# Patient Record
Sex: FEMALE | Race: WHITE | NOT HISPANIC OR LATINO | Employment: PART TIME | URBAN - METROPOLITAN AREA
[De-identification: names, ages, dates, MRNs, and addresses within clinical notes are randomized per-mention and may not be internally consistent; named-entity substitution may affect disease eponyms.]

---

## 2017-02-06 ENCOUNTER — OFFICE VISIT (OUTPATIENT)
Dept: OBGYN | Facility: CLINIC | Age: 47
End: 2017-02-06
Payer: COMMERCIAL

## 2017-02-06 VITALS
BODY MASS INDEX: 18.26 KG/M2 | HEART RATE: 56 BPM | SYSTOLIC BLOOD PRESSURE: 110 MMHG | HEIGHT: 60 IN | WEIGHT: 93 LBS | DIASTOLIC BLOOD PRESSURE: 68 MMHG

## 2017-02-06 DIAGNOSIS — N95.1 SYMPTOMS, SUCH AS FLUSHING, SLEEPLESSNESS, HEADACHE, LACK OF CONCENTRATION, ASSOCIATED WITH THE MENOPAUSE: ICD-10-CM

## 2017-02-06 DIAGNOSIS — Z01.419 ENCOUNTER FOR GYNECOLOGICAL EXAMINATION WITHOUT ABNORMAL FINDING: Primary | ICD-10-CM

## 2017-02-06 DIAGNOSIS — F32.A DEPRESSION, UNSPECIFIED DEPRESSION TYPE: ICD-10-CM

## 2017-02-06 PROCEDURE — 87624 HPV HI-RISK TYP POOLED RSLT: CPT | Performed by: OBSTETRICS & GYNECOLOGY

## 2017-02-06 PROCEDURE — 83001 ASSAY OF GONADOTROPIN (FSH): CPT | Performed by: OBSTETRICS & GYNECOLOGY

## 2017-02-06 PROCEDURE — 36415 COLL VENOUS BLD VENIPUNCTURE: CPT | Performed by: OBSTETRICS & GYNECOLOGY

## 2017-02-06 PROCEDURE — 99212 OFFICE O/P EST SF 10 MIN: CPT | Mod: 25 | Performed by: OBSTETRICS & GYNECOLOGY

## 2017-02-06 PROCEDURE — 99386 PREV VISIT NEW AGE 40-64: CPT | Performed by: OBSTETRICS & GYNECOLOGY

## 2017-02-06 PROCEDURE — G0145 SCR C/V CYTO,THINLAYER,RESCR: HCPCS | Performed by: OBSTETRICS & GYNECOLOGY

## 2017-02-06 RX ORDER — ESCITALOPRAM OXALATE 10 MG/1
10 TABLET ORAL DAILY
Qty: 90 TABLET | Refills: 3 | Status: SHIPPED | OUTPATIENT
Start: 2017-02-06 | End: 2018-06-12

## 2017-02-06 ASSESSMENT — ANXIETY QUESTIONNAIRES
7. FEELING AFRAID AS IF SOMETHING AWFUL MIGHT HAPPEN: NEARLY EVERY DAY
3. WORRYING TOO MUCH ABOUT DIFFERENT THINGS: NEARLY EVERY DAY
GAD7 TOTAL SCORE: 21
IF YOU CHECKED OFF ANY PROBLEMS ON THIS QUESTIONNAIRE, HOW DIFFICULT HAVE THESE PROBLEMS MADE IT FOR YOU TO DO YOUR WORK, TAKE CARE OF THINGS AT HOME, OR GET ALONG WITH OTHER PEOPLE: EXTREMELY DIFFICULT
6. BECOMING EASILY ANNOYED OR IRRITABLE: NEARLY EVERY DAY
5. BEING SO RESTLESS THAT IT IS HARD TO SIT STILL: NEARLY EVERY DAY
2. NOT BEING ABLE TO STOP OR CONTROL WORRYING: NEARLY EVERY DAY
1. FEELING NERVOUS, ANXIOUS, OR ON EDGE: NEARLY EVERY DAY

## 2017-02-06 ASSESSMENT — PATIENT HEALTH QUESTIONNAIRE - PHQ9: 5. POOR APPETITE OR OVEREATING: NEARLY EVERY DAY
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
## 2017-02-07 LAB — FSH SERPL-ACNC: 153.6 IU/L

## 2017-02-07 ASSESSMENT — ANXIETY QUESTIONNAIRES: GAD7 TOTAL SCORE: 21

## 2017-02-07 ASSESSMENT — PATIENT HEALTH QUESTIONNAIRE - PHQ9: SUM OF ALL RESPONSES TO PHQ QUESTIONS 1-9: 24

## 2017-02-09 LAB
COPATH REPORT: NORMAL
PAP: NORMAL

## 2017-02-10 ENCOUNTER — TRANSFERRED RECORDS (OUTPATIENT)
Dept: HEALTH INFORMATION MANAGEMENT | Facility: CLINIC | Age: 47
End: 2017-02-10

## 2017-02-13 LAB
FINAL DIAGNOSIS: NORMAL
HPV HR 12 DNA CVX QL NAA+PROBE: NEGATIVE
HPV16 DNA SPEC QL NAA+PROBE: NEGATIVE
HPV18 DNA SPEC QL NAA+PROBE: NEGATIVE
SPECIMEN DESCRIPTION: NORMAL

## 2018-06-12 ENCOUNTER — OFFICE VISIT (OUTPATIENT)
Dept: OBGYN | Facility: CLINIC | Age: 48
End: 2018-06-12
Payer: COMMERCIAL

## 2018-06-12 VITALS
DIASTOLIC BLOOD PRESSURE: 60 MMHG | WEIGHT: 91.4 LBS | BODY MASS INDEX: 17.94 KG/M2 | SYSTOLIC BLOOD PRESSURE: 98 MMHG | HEIGHT: 60 IN

## 2018-06-12 DIAGNOSIS — N32.81 OAB (OVERACTIVE BLADDER): Primary | ICD-10-CM

## 2018-06-12 PROCEDURE — 99214 OFFICE O/P EST MOD 30 MIN: CPT | Performed by: OBSTETRICS & GYNECOLOGY

## 2018-06-12 NOTE — PROGRESS NOTES
SUBJECTIVE:                                                   Maribel Mcgrath is a 47 year old female who presents to clinic today for the following health issue(s):  Patient presents with:  Vaginal Problem: Patient c/o pelvic pain/pressure as well as urgency for about a week now. Was seen with pcp for a negative UTI.         HPI:  Patient has been drinking a lot of lemonade recently, then started to get suprapubic pain and urgency and frequency  Just started to get nocturia also  Had a normal ua at her pcp  No dysuria, cvat    No LMP recorded. Patient is postmenopausal..   Patient is not sexually active, No obstetric history on file..  Using menopause for contraception.    reports that she has never smoked. She has never used smokeless tobacco.    STD testing offered?  Declined    Health maintenance updated:  yes    Today's PHQ-2 Score: No flowsheet data found.  Today's PHQ-9 Score:   PHQ-9 SCORE 2/6/2017   Total Score 24     Today's IVAN-7 Score:   IVAN-7 SCORE 2/6/2017   Total Score 21       Problem list and histories reviewed & adjusted, as indicated.  Additional history: as documented.    There is no problem list on file for this patient.    Past Surgical History:   Procedure Laterality Date     ADENOIDECTOMY       COSMETIC MAMMOPLASTY AUGMENTATION BILATERAL  2011      Social History   Substance Use Topics     Smoking status: Never Smoker     Smokeless tobacco: Never Used     Alcohol use 0.0 oz/week     0 Standard drinks or equivalent per week      Problem (# of Occurrences) Relation (Name,Age of Onset)    DIABETES (1) Maternal Grandmother    HEART DISEASE (2) Mother, Father    Hyperlipidemia (2) Mother, Father            No current outpatient prescriptions on file.     No current facility-administered medications for this visit.      No Known Allergies    ROS:  12 point review of systems negative other than symptoms noted below.  Genitourinary: Pelvic Pain and Urgency  Psychiatric: Anxiety    OBJECTIVE:      BP 98/60  Ht 5' (1.524 m)  Wt 91 lb 6.4 oz (41.5 kg)  BMI 17.85 kg/m2  Body mass index is 17.85 kg/(m^2).    Exam:  Constitutional:  Appearance: Well nourished, well developed alert, in no acute distress  Chest:  Respiratory Effort:  Breathing unlabored  Skin:General Inspection:  No rashes present, no lesions present, no areas of discoloration; Genitalia and Groin:  No rashes present, no lesions present, no areas of discoloration, no masses present.  Neurologic/Psychiatric:  Mental Status:  Oriented X3   Pelvic Exam:  External Genitalia:     Normal appearance for age, no discharge present, no tenderness present, no inflammatory lesions present, color normal  Vagina:     Normal vaginal vault without central or paravaginal defects, no discharge present, no inflammatory lesions present, no masses present  Bladder:     Nontender to palpation  Urethra:   Urethral Body:  Urethra palpation normal, urethra structural support normal   Urethral Meatus:  No erythema or lesions present  Cervix:     Appearance healthy, no lesions present, nontender to palpation, no bleeding present  Uterus:     Uterus: firm, normal sized and nontender, midplane in position.   Adnexa:     No adnexal tenderness present, no adnexal masses present  Perineum:     Perineum within normal limits, no evidence of trauma, no rashes or skin lesions present  Anus:     Anus within normal limits, no hemorrhoids present  Inguinal Lymph Nodes:     No lymphadenopathy present  Pubic Hair:     Normal pubic hair distribution for age  Genitalia and Groin:     No rashes present, no lesions present, no areas of discoloration, no masses present       In-Clinic Test Results:  No results found for this or any previous visit (from the past 24 hour(s)).    ASSESSMENT/PLAN:                                                        ICD-10-CM    1. OAB (overactive bladder) N32.81          Patient is likely having oab symptoms  She is menopausal and not on any estrogen  rec  she stop the lemonade and reduce other acidic foods in her diet  Discussed oab symptoms, common in menopause  Normal pelvic exam  I don't think meds are indicated at this time since her symptoms seem to be associated with a significant increase in acidic beverage intake and hopefully will improve by changing beverage to water    Karley Ross MD  [facility]

## 2018-06-12 NOTE — MR AVS SNAPSHOT
"              After Visit Summary   6/12/2018    Maribel Mcgrath    MRN: [id number]           Patient Information     Date Of Birth          1970        Visit Information        Provider Department      6/12/2018 8:00 AM Karley Ross MD [facility]        Today's Diagnoses     OAB (overactive bladder)    -  1       Follow-ups after your visit        Your next 10 appointments already scheduled     Jun 19, 2018  2:45 PM CDT   MA SCREENING DIGITAL BILATERAL with [email or web address]   [facility] ([facility])    [address] [place] [address], [address]  [facility] [address] [phone]           Do not use any powder, lotion or deodorant under your arms or on your breast. If you do, we will ask you to remove it before your exam.  Wear comfortable, two-piece clothing.  If you have any allergies, tell your care team.  Bring any previous mammograms from other facilities or have them mailed to the breast center. Three-dimensional (3D) mammograms are available at [place] locations in [facility], [facility], and [place]. [facility] locations include [place] [facility] in [place]. Benefits of 3D mammograms include: - Improved rate of cancer detection - Decreases your chance of having to go back for more tests, which means fewer: - \"False-positive\" results (This means that there is an abnormal area but it isn't cancer.) - Invasive testing procedures, such as a biopsy or surgery - Can provide clearer images of the breast if you have dense breast tissue. 3D mammography is an optional exam that anyone can have with a 2D mammogram. It doesn't replace or take the place of a 2D mammogram. 2D mammograms remain an effective screening test for all women.  Not all insurance companies cover the cost of a 3D mammogram. Check with your insurance.            Jun 19, 2018  3:10 PM CDT   PHYSICAL with Karley RAPHAEL " "MD [place]   [facility] [place] ([facility])    [address] [address] [phone]              Who to contact     If you have questions or need follow up information about today's clinic visit or your schedule please contact [facility] [place] directly at [phone].  Normal or non-critical lab and imaging results will be communicated to you by MyChart, letter or phone within 4 business days after the clinic has received the results. If you do not hear from us within 7 days, please contact the clinic through [employer] or phone. If you have a critical or abnormal lab result, we will notify you by phone as soon as possible.  Submit refill requests through [employer] or call your pharmacy and they will forward the refill request to us. Please allow 3 business days for your refill to be completed.          Additional Information About Your Visit        [employer] [facility] Information     [employer] lets you send messages to your doctor, view your test results, renew your prescriptions, schedule appointments and more. To sign up, go to [email or web address] . Click on \"Log in\" on the left side of the screen, which will take you to the Welcome page. Then click on \"Sign up Now\" on the right side of the page.     You will be asked to enter the access code listed below, as well as some personal information. Please follow the directions to create your username and password.     Your access code is: [email or web address]  Expires: 9/10/2018  7:59 AM     Your access code will  in 90 days. If you need help or a new code, please call your [facility] clinic or [phone].        Care EveryWhere ID     This is your Care EveryWhere ID. This could be used by other organizations to access your [facility] medical records  [id number]        Your Vitals Were     Height BMI (Body Mass Index)                5' (1.524 m) 17.85 kg/m2           Blood Pressure from Last 3 " Encounters:   06/12/18 98/60   02/06/17 110/68    Weight from Last 3 Encounters:   06/12/18 91 lb 6.4 oz (41.5 kg)   02/06/17 93 lb (42.2 kg)              Today, you had the following     No orders found for display       Primary Care Provider Office Phone # Fax [facility] [phone] [address] [facility] [address]        Equal Access to Services     RUTHANN SANCHEZ : Hadii aad ku hadasho Soomaali, waaxda luqadaha, qaybta kaalmada adeegyada, waxay idiin hayaan adestephanie alvaradoarayina lajuan jose . So [facility] [phone].    ATENCIÓN: Si habla español, tiene a betancourt disposición servicios gratuitos de asistencia lingüística. [facility] [phone].    We comply with applicable federal civil rights laws and [place] laws. We do not discriminate on the basis of race, color, national origin, age, disability, sex, sexual orientation, or gender identity.            Thank you!     Thank you for choosing [facility] [place], [place]  for your care. Our goal is always to provide you with excellent care. Hearing back from our patients is one way we can continue to improve our services. Please take a few minutes to complete the written survey that you may receive in the mail after your visit with us. Thank you!             Your Updated Medication List - Protect others around you: Learn how to safely use, store and throw away your medicines at www.disposemymeds.org.      Notice  As of 6/12/2018  8:32 AM    You have not been prescribed any medications.

## 2018-06-19 ENCOUNTER — OFFICE VISIT (OUTPATIENT)
Dept: OBGYN | Facility: CLINIC | Age: 48
End: 2018-06-19
Payer: COMMERCIAL

## 2018-06-19 ENCOUNTER — RADIANT APPOINTMENT (OUTPATIENT)
Dept: MAMMOGRAPHY | Facility: CLINIC | Age: 48
End: 2018-06-19
Payer: COMMERCIAL

## 2018-06-19 VITALS
HEIGHT: 60 IN | WEIGHT: 83 LBS | SYSTOLIC BLOOD PRESSURE: 118 MMHG | HEART RATE: 66 BPM | BODY MASS INDEX: 16.3 KG/M2 | DIASTOLIC BLOOD PRESSURE: 74 MMHG

## 2018-06-19 DIAGNOSIS — Z12.31 VISIT FOR SCREENING MAMMOGRAM: ICD-10-CM

## 2018-06-19 DIAGNOSIS — E55.9 VITAMIN D DEFICIENCY: ICD-10-CM

## 2018-06-19 DIAGNOSIS — Z01.419 ENCOUNTER FOR GYNECOLOGICAL EXAMINATION WITHOUT ABNORMAL FINDING: Primary | ICD-10-CM

## 2018-06-19 DIAGNOSIS — E28.319 PREMATURE MENOPAUSE: ICD-10-CM

## 2018-06-19 DIAGNOSIS — N32.81 OAB (OVERACTIVE BLADDER): ICD-10-CM

## 2018-06-19 PROCEDURE — 77067 SCR MAMMO BI INCL CAD: CPT | Mod: TC

## 2018-06-19 PROCEDURE — 99396 PREV VISIT EST AGE 40-64: CPT | Performed by: OBSTETRICS & GYNECOLOGY

## 2018-06-19 ASSESSMENT — ANXIETY QUESTIONNAIRES
3. WORRYING TOO MUCH ABOUT DIFFERENT THINGS: NEARLY EVERY DAY
GAD7 TOTAL SCORE: 21
1. FEELING NERVOUS, ANXIOUS, OR ON EDGE: NEARLY EVERY DAY
7. FEELING AFRAID AS IF SOMETHING AWFUL MIGHT HAPPEN: NEARLY EVERY DAY
5. BEING SO RESTLESS THAT IT IS HARD TO SIT STILL: NEARLY EVERY DAY
6. BECOMING EASILY ANNOYED OR IRRITABLE: NEARLY EVERY DAY
IF YOU CHECKED OFF ANY PROBLEMS ON THIS QUESTIONNAIRE, HOW DIFFICULT HAVE THESE PROBLEMS MADE IT FOR YOU TO DO YOUR WORK, TAKE CARE OF THINGS AT HOME, OR GET ALONG WITH OTHER PEOPLE: EXTREMELY DIFFICULT
2. NOT BEING ABLE TO STOP OR CONTROL WORRYING: NEARLY EVERY DAY

## 2018-06-19 ASSESSMENT — PATIENT HEALTH QUESTIONNAIRE - PHQ9: 5. POOR APPETITE OR OVEREATING: NEARLY EVERY DAY

## 2018-06-19 NOTE — PROGRESS NOTES
Maribel is a 47 year old No obstetric history on file. female who presents for annual exam.     Besides routine health maintenance, she has no other health concerns today .    HPI:  The patient's PCP is [facility]. [facility].    Patient had a very early menopause and has a very high fsh 157 last years  Not on hormone replacement therapy  Had an ankle fracture this winter when running and tripped  Needs a bone density and vit D level    Severe vaginal dryness and pain with sex  Also oab symptoms, got better recently when stopped drinking lemonade      GYNECOLOGIC HISTORY:    No LMP recorded. Patient is postmenopausal.  Her current contraception method is: menopause.  She  reports that she has never smoked. She has never used smokeless tobacco.    Patient is sexually active.  STD testing offered?  Declined  Last PHQ-9 score on record =   PHQ-9 SCORE 6/19/2018   Total Score 24     Last GAD7 score on record =   IVAN-7 SCORE 6/19/2018   Total Score 21     Alcohol Score = 2    HEALTH MAINTENANCE:  Cholesterol: 5/3/16  Total= 194, Triglycerides=53, HDL=64, KXR=972, PDK=701, TSH=6/12/17 2.46  Last Mammo: one year ago, Result: normal, Next Mammo: today   Pap: (  Lab Results   Component Value Date    PAP NIL 02/06/2017 2/6/17 WNL HPV (-)neg  Colonoscopy:  NA, Result: not applicable, Next Colonoscopy: due in September.  Dexa:  NA    Health maintenance updated:  yes    HISTORY:  Obstetric History     No data available          There is no problem list on file for this patient.    Past Surgical History:   Procedure Laterality Date     ADENOIDECTOMY       COSMETIC MAMMOPLASTY AUGMENTATION BILATERAL  2011      Social History   Substance Use Topics     Smoking status: Never Smoker     Smokeless tobacco: Never Used     Alcohol use 0.0 oz/week     0 Standard drinks or equivalent per week      Problem (# of Occurrences) Relation (Name,Age of Onset)    Diabetes (1) Maternal Grandmother    HEART DISEASE (2)  "Mother, Father    Hyperlipidemia (2) Mother, Father            No current outpatient prescriptions on file.     No current facility-administered medications for this visit.      No Known Allergies    Past medical, surgical, social and family histories were reviewed and updated in EPIC.    ROS:   Cardiovascular: Chest Pain, Lightheadedness and Palpitations  Genitourinary: Pelvic Pain and Urgency  Psychiatric: Anxiety and Depression    EXAM:  /74  Pulse 66  Ht 4' 11.5\" (1.511 m)  Wt 83 lb (37.6 kg)  Breastfeeding? No  BMI 16.48 kg/m2   BMI: Body mass index is 16.48 kg/(m^2).    PHYSICAL EXAM:  Constitutional:  Appearance: Well nourished, well developed, alert, in no acute distress  Neck:  Lymph Nodes:  No lymphadenopathy present    Thyroid:  Gland size normal, nontender, no nodules or masses present  on palpation  Chest:  Respiratory Effort:  Breathing unlabored  Cardiovascular:    Heart: Auscultation:  Regular rate, normal rhythm, no murmurs present  Breasts: Inspection of Breasts:  No lymphadenopathy present., Palpation of Breasts and Axillae:  No masses present on palpation, no breast tenderness., Axillary Lymph Nodes:  No lymphadenopathy present. and No nodularity, asymmetry or nipple discharge bilaterally.  Gastrointestinal:   Abdominal Examination:  Abdomen nontender to palpation, tone normal without rigidity or guarding, no masses present, umbilicus without lesions   Liver and Spleen:  No hepatomegaly present, liver nontender to palpation    Hernias:  No hernias present  Lymphatic: Lymph Nodes:  No other lymphadenopathy present  Skin:  General Inspection:  No rashes present, no lesions present, no areas of  discoloration    Genitalia and Groin:  No rashes present, no lesions present, no areas of  discoloration, no masses present  Neurologic/Psychiatric:    Mental Status:  Oriented X3     Pelvic Exam:  External Genitalia:     Normal appearance for age, no discharge present, no tenderness present, no " inflammatory lesions present, color normal  Vagina:     Normal vaginal vault without central or paravaginal defects, ATROPHIC  Bladder:     Nontender to palpation  Urethra:   Urethral Body:  Urethra palpation normal, urethra structural support normal   Urethral Meatus:  No erythema or lesions present  Cervix:     Appearance healthy, no lesions present, nontender to palpation, no bleeding present  Uterus:     Nontender to palpation, no masses present, position anteflexed, mobility: normal  Adnexa:     No adnexal tenderness present, no adnexal masses present  Perineum:     Perineum within normal limits, no evidence of trauma, no rashes or skin lesions present  Inguinal Lymph Nodes:     No lymphadenopathy present      COUNSELING:   Reviewed preventive health counseling, as reflected in patient instructions       Regular exercise       Healthy diet/nutrition    BMI: Body mass index is 16.48 kg/(m^2).      ASSESSMENT:  47 year old female with satisfactory annual exam.    ICD-10-CM    1. Encounter for gynecological examination without abnormal finding Z01.419        PLAN:  Make psych appt to manage her anxiety    Return for dexa and Vit D, I suspect she will have poor density  Start vit D 2000 IU  Avoid acidic beverages    Prescription sent for estring   If she thinks too expensive, can call for change to estrogen vaginal cream twice a week which would be cheaper but much messier    mammo done  Pap and hpv q 5y  Discussed oab briefly, risk of osteoporosis, vaginal atrophy    Karley Ross MD

## 2018-06-19 NOTE — MR AVS SNAPSHOT
After Visit Summary   6/19/2018    Maribel Mcgrath    MRN: [id number]           Patient Information     Date Of Birth          1970        Visit Information        Provider Department      6/19/2018 3:10 PM Karley Ross MD [facility]        Today's Diagnoses     Encounter for gynecological examination without abnormal finding    -  1    Vitamin D deficiency        Premature menopause        OAB (overactive bladder)           Follow-ups after your visit        Your next 10 appointments already scheduled     Jun 25, 2018  9:00 AM CDT   DX HIP/PELVIS/SPINE with WEDEXA1   [facility] ([facility])    [address] [address]   [phone]           Please do not take any of the following 24 hours prior to the day of your exam: vitamins, calcium tablets, antacids.  If possible, please wear clothes without metal (snaps, zippers). A sweatsuit works well.              Future tests that were ordered for you today     Open Future Orders        Priority Expected Expires Ordered    DX Hip/Pelvis/Spine Routine  6/19/2019 6/19/2018    Vitamin D Deficiency Routine  6/19/2019 6/19/2018            Who to contact     If you have questions or need follow up information about today's clinic visit or your schedule please contact [facility] directly at [phone].  Normal or non-critical lab and imaging results will be communicated to you by MyChart, letter or phone within 4 business days after the clinic has received the results. If you do not hear from us within 7 days, please contact the clinic through MyChart or phone. If you have a critical or abnormal lab result, we will notify you by phone as soon as possible.  Submit refill requests through [employer] or call your pharmacy and they will forward the refill request to us. Please allow 3 business days for your refill to be completed.           "Additional Information About Your Visit        MyChart Information     Sunlot lets you send messages to your doctor, view your test results, renew your prescriptions, schedule appointments and more. To sign up, go to [email or web address] . Click on \"Log in\" on the left side of the screen, which will take you to the Welcome page. Then click on \"Sign up Now\" on the right side of the page.     You will be asked to enter the access code listed below, as well as some personal information. Please follow the directions to create your username and password.     Your access code is: [email or web address]  Expires: 9/10/2018  7:59 AM     Your access code will  in 90 days. If you need help or a new code, please call your [facility] clinic or [phone].        Care EveryWhere ID     This is your Care EveryWhere ID. This could be used by other organizations to access your [facility] medical records  [id number]        Your Vitals Were     Pulse Height Breastfeeding? BMI (Body Mass Index)          66 4' 11.5\" (1.511 m) No 16.48 kg/m2         Blood Pressure from Last 3 Encounters:   18 118/74   18 98/60   17 110/68    Weight from Last 3 Encounters:   18 83 lb (37.6 kg)   18 91 lb 6.4 oz (41.5 kg)   17 93 lb (42.2 kg)                 Today's Medication Changes          These changes are accurate as of 18  3:54 PM.  If you have any questions, ask your nurse or doctor.               Start taking these medicines.        Dose/Directions    estradiol 2 MG vaginal ring   Commonly known as:  ESTRING   Used for:  Premature menopause   Started by:  Karley Ross MD        Dose:  1 each   Place 1 each vaginally every 3 months   Quantity:  1 each   Refills:  3            Where to get your medicines      These medications were sent to [facility] [address] - [address] & [place] [address] [place] [address]     Phone:  [phone]     " estradiol 2 MG vaginal ring                Primary Care Provider Office Phone # Fax #    [facility] [phone] [address] [facility] [address]        Equal Access to Services     RUTHANN SANCHEZ : Hadii aad ku hadwandy Pedraza, wajuan albertoda luqadaha, qalashaunta kaalmada jamal, katie kleinrona blankenship miryam shane lucero. So [facility] [phone].    ATENCIÓN: Si habla español, tiene a betancourt disposición servicios gratuitos de asistencia lingüística. Llame al [phone].    We comply with applicable federal civil rights laws and [place] laws. We do not discriminate on the basis of race, color, national origin, age, disability, sex, sexual orientation, or gender identity.            Thank you!     Thank you for choosing [facility]  for your care. Our goal is always to provide you with excellent care. Hearing back from our patients is one way we can continue to improve our services. Please take a few minutes to complete the written survey that you may receive in the mail after your visit with us. Thank you!             Your Updated Medication List - Protect others around you: Learn how to safely use, store and throw away your medicines at www.disposemymeds.org.          This list is accurate as of 6/19/18  3:54 PM.  Always use your most recent med list.                   Brand Name Dispense Instructions for use Diagnosis    estradiol 2 MG vaginal ring    ESTRING    1 each    Place 1 each vaginally every 3 months    Premature menopause

## 2018-06-20 DIAGNOSIS — E28.319 PREMATURE MENOPAUSE: Primary | ICD-10-CM

## 2018-06-20 DIAGNOSIS — N89.8 VAGINAL DRYNESS: ICD-10-CM

## 2018-06-20 RX ORDER — ESTRADIOL 10 UG/1
10 INSERT VAGINAL
Qty: 24 TABLET | Refills: 3 | Status: SHIPPED | OUTPATIENT
Start: 2018-06-21 | End: 2021-08-31

## 2018-06-20 ASSESSMENT — PATIENT HEALTH QUESTIONNAIRE - PHQ9: SUM OF ALL RESPONSES TO PHQ QUESTIONS 1-9: 24

## 2018-06-20 ASSESSMENT — ANXIETY QUESTIONNAIRES: GAD7 TOTAL SCORE: 21

## 2018-06-20 NOTE — TELEPHONE ENCOUNTER
[facility] pharmacy called and Estradiol vaginal ring is not covered by insurance. Stated the Estradiol vaginal tabs are covered by insurance. Pharmacist wants to know if we will do a PA or send Rx for estradiol vag tabs.  Routing to Dr. Ross. Please advise.    Telephone number for Insurance Help desk for PA is [phone]

## 2018-06-21 ENCOUNTER — TELEPHONE (OUTPATIENT)
Dept: OBGYN | Facility: CLINIC | Age: 48
End: 2018-06-21

## 2018-06-21 NOTE — TELEPHONE ENCOUNTER
Patient will check with insurance and pharmacy for cost and other options. Nothing more to be done until she calls us back.    Info off rejection: plan # [phone]  ID [id number]

## 2018-06-23 ENCOUNTER — HEALTH MAINTENANCE LETTER (OUTPATIENT)
Age: 48
End: 2018-06-23

## 2018-06-25 ENCOUNTER — RADIANT APPOINTMENT (OUTPATIENT)
Dept: BONE DENSITY | Facility: CLINIC | Age: 48
End: 2018-06-25
Payer: COMMERCIAL

## 2018-06-25 DIAGNOSIS — E28.319 PREMATURE MENOPAUSE: ICD-10-CM

## 2018-06-25 PROCEDURE — 77080 DXA BONE DENSITY AXIAL: CPT | Performed by: OBSTETRICS & GYNECOLOGY

## 2019-10-02 ENCOUNTER — HEALTH MAINTENANCE LETTER (OUTPATIENT)
Age: 49
End: 2019-10-02

## 2021-01-15 ENCOUNTER — HEALTH MAINTENANCE LETTER (OUTPATIENT)
Age: 51
End: 2021-01-15

## 2021-08-31 ENCOUNTER — ANCILLARY PROCEDURE (OUTPATIENT)
Dept: MAMMOGRAPHY | Facility: CLINIC | Age: 51
End: 2021-08-31
Payer: COMMERCIAL

## 2021-08-31 ENCOUNTER — OFFICE VISIT (OUTPATIENT)
Dept: OBGYN | Facility: CLINIC | Age: 51
End: 2021-08-31
Payer: COMMERCIAL

## 2021-08-31 VITALS
SYSTOLIC BLOOD PRESSURE: 102 MMHG | RESPIRATION RATE: 18 BRPM | DIASTOLIC BLOOD PRESSURE: 58 MMHG | HEART RATE: 70 BPM | WEIGHT: 90.7 LBS | BODY MASS INDEX: 18.01 KG/M2

## 2021-08-31 DIAGNOSIS — Z12.31 VISIT FOR SCREENING MAMMOGRAM: ICD-10-CM

## 2021-08-31 DIAGNOSIS — E28.319 PREMATURE MENOPAUSE: ICD-10-CM

## 2021-08-31 DIAGNOSIS — Z01.419 ENCOUNTER FOR GYNECOLOGICAL EXAMINATION WITHOUT ABNORMAL FINDING: Primary | ICD-10-CM

## 2021-08-31 PROBLEM — E78.2 MIXED HYPERLIPIDEMIA: Status: ACTIVE | Noted: 2021-08-31

## 2021-08-31 PROBLEM — F98.8 ADD (ATTENTION DEFICIT DISORDER) WITHOUT HYPERACTIVITY: Status: ACTIVE | Noted: 2021-08-31

## 2021-08-31 PROBLEM — H52.03 HYPEROPIA, BILATERAL: Status: ACTIVE | Noted: 2021-08-12

## 2021-08-31 PROBLEM — F32.1 DEPRESSION, MAJOR, SINGLE EPISODE, MODERATE (H): Status: ACTIVE | Noted: 2019-10-16

## 2021-08-31 PROBLEM — H52.4 PRESBYOPIA: Status: ACTIVE | Noted: 2021-08-12

## 2021-08-31 PROCEDURE — 99386 PREV VISIT NEW AGE 40-64: CPT | Performed by: OBSTETRICS & GYNECOLOGY

## 2021-08-31 PROCEDURE — 77063 BREAST TOMOSYNTHESIS BI: CPT | Mod: TC | Performed by: RADIOLOGY

## 2021-08-31 PROCEDURE — 77067 SCR MAMMO BI INCL CAD: CPT | Mod: TC | Performed by: RADIOLOGY

## 2021-08-31 PROCEDURE — 82306 VITAMIN D 25 HYDROXY: CPT | Performed by: OBSTETRICS & GYNECOLOGY

## 2021-08-31 PROCEDURE — 36415 COLL VENOUS BLD VENIPUNCTURE: CPT | Performed by: OBSTETRICS & GYNECOLOGY

## 2021-08-31 PROCEDURE — G0145 SCR C/V CYTO,THINLAYER,RESCR: HCPCS | Performed by: OBSTETRICS & GYNECOLOGY

## 2021-08-31 PROCEDURE — 87624 HPV HI-RISK TYP POOLED RSLT: CPT | Performed by: OBSTETRICS & GYNECOLOGY

## 2021-08-31 RX ORDER — ROSUVASTATIN CALCIUM 5 MG/1
TABLET, COATED ORAL
COMMUNITY
Start: 2019-11-28 | End: 2021-08-31

## 2021-08-31 RX ORDER — BUPROPION HYDROCHLORIDE 150 MG/1
TABLET ORAL
COMMUNITY
Start: 2019-10-17 | End: 2021-08-31

## 2021-08-31 ASSESSMENT — PATIENT HEALTH QUESTIONNAIRE - PHQ9
SUM OF ALL RESPONSES TO PHQ QUESTIONS 1-9: 21
5. POOR APPETITE OR OVEREATING: NEARLY EVERY DAY

## 2021-08-31 ASSESSMENT — ANXIETY QUESTIONNAIRES
6. BECOMING EASILY ANNOYED OR IRRITABLE: NEARLY EVERY DAY
3. WORRYING TOO MUCH ABOUT DIFFERENT THINGS: NEARLY EVERY DAY
GAD7 TOTAL SCORE: 21
7. FEELING AFRAID AS IF SOMETHING AWFUL MIGHT HAPPEN: NEARLY EVERY DAY
IF YOU CHECKED OFF ANY PROBLEMS ON THIS QUESTIONNAIRE, HOW DIFFICULT HAVE THESE PROBLEMS MADE IT FOR YOU TO DO YOUR WORK, TAKE CARE OF THINGS AT HOME, OR GET ALONG WITH OTHER PEOPLE: EXTREMELY DIFFICULT
5. BEING SO RESTLESS THAT IT IS HARD TO SIT STILL: NEARLY EVERY DAY
1. FEELING NERVOUS, ANXIOUS, OR ON EDGE: NEARLY EVERY DAY
2. NOT BEING ABLE TO STOP OR CONTROL WORRYING: NEARLY EVERY DAY

## 2021-08-31 NOTE — PROGRESS NOTES
Maribel is a 50 year old  female who presents for annual exam.     Besides routine health maintenance, she has no other health concerns today .    HPI:  The patient's PCP is  [facility].   Never started nuvaring or vagifem  Early menopause at age 44.   Tried selective serotonin reuptake inhibitor and wellbutrin for depression, doesn't think they helped  Feels like   Her dad  recently, stress with settling estate        GYNECOLOGIC HISTORY:    No LMP recorded. Patient is postmenopausal.    Her current contraception method is: menopause.  She  reports that she has never smoked. She has never used smokeless tobacco.  Patient is sexually active.  STD testing offered?  Declined     Last PHQ-9 score on record =   PHQ-9 SCORE 2021   PHQ-9 Total Score 21     Last GAD7 score on record =   IVAN-7 SCORE 2021   Total Score 21     Alcohol Score =  3    HEALTH MAINTENANCE:    Cholesterol:No results found for: CHOL   Last Mammo: 2018, Result: Normal, Next Mammo: Today     Pap:   Lab Results   Component Value Date    PAP NIL 2017      Colonoscopy: N/A,   Result: Not applicable,  Next Colonoscopy: Due  Dexa:  18    Health maintenance updated:  yes    HISTORY:  OB History    Para Term  AB Living   1 1 1 0 0 1   SAB TAB Ectopic Multiple Live Births   0 0 0 0 1      # Outcome Date GA Lbr [place]/2nd Weight Sex Delivery Anes PTL Lv   1 Term         RENATO       Patient Active Problem List   Diagnosis     ADD (attention deficit disorder) without hyperactivity     Depression, major, single episode, moderate (H)     Hyperopia, bilateral     Mixed hyperlipidemia     Presbyopia     Past Surgical History:   Procedure Laterality Date     ADENOIDECTOMY       COSMETIC MAMMOPLASTY AUGMENTATION BILATERAL        Social History     Tobacco Use     Smoking status: Never Smoker     Smokeless tobacco: Never Used   Substance Use Topics     Alcohol use: Yes     Alcohol/week: 0.0  standard drinks      Problem (# of Occurrences) Relation (Name,Age of Onset)    Diabetes (1) Maternal Grandmother    Heart Disease (2) Mother, Father    Hyperlipidemia (2) Mother, Father            No current outpatient medications on file.     No current facility-administered medications for this visit.     Allergies   Allergen Reactions     Atorvastatin Muscle Pain (Myalgia)     Severe body cramps       Past medical, surgical, social and family histories were reviewed and updated in EPIC.    ROS:   12 point review of systems negative other than symptoms noted below or in the HPI.  No urinary frequency or dysuria, bladder or kidney problems    EXAM:  /58 (BP Location: Right arm, Patient Position: Sitting, Cuff Size: Adult Regular)   Pulse 70   Resp 18   Wt 41.1 kg (90 lb 11.2 oz)   Breastfeeding No   BMI 18.01 kg/m     BMI: Body mass index is 18.01 kg/m .    PHYSICAL EXAM:  Constitutional:   Appearance: Well nourished, well developed, alert, in no acute distress  Neck:  Lymph Nodes:  No lymphadenopathy present    Thyroid:  Gland size normal, nontender, no nodules or masses present  on palpation  Chest:  Respiratory Effort:  Breathing unlabored  Cardiovascular:    Heart: Auscultation:  Regular rate, normal rhythm, no murmurs present  Breasts: Inspection of Breasts:  No lymphadenopathy present., Palpation of Breasts and Axillae:  No masses present on palpation, no breast tenderness., Axillary Lymph Nodes:  No lymphadenopathy present. and No nodularity, asymmetry or nipple discharge bilaterally.  Gastrointestinal:   Abdominal Examination:  Abdomen nontender to palpation, tone normal without rigidity or guarding, no masses present, umbilicus without lesions   Liver and Spleen:  No hepatomegaly present, liver nontender to palpation    Hernias:  No hernias present  Lymphatic: Lymph Nodes:  No other lymphadenopathy present  Skin:  General Inspection:  No rashes present, no lesions present, no areas of   discoloration  Neurologic:    Mental Status:  Oriented X3.  Normal strength and tone, sensory exam                grossly normal, mentation intact and speech normal.    Psychiatric:   Mentation appears normal and affect normal/bright.         Pelvic Exam:  External Genitalia:     Normal appearance for age, no discharge present, no tenderness present, no inflammatory lesions present, color normal  Vagina:     Normal vaginal vault without central or paravaginal defects, no discharge present, no inflammatory lesions present, no masses present  Bladder:     Nontender to palpation  Urethra:   Urethral Body:  Urethra palpation normal, urethra structural support normal   Urethral Meatus:  No erythema or lesions present  Cervix:     Appearance healthy, no lesions present, nontender to palpation, no bleeding present  Uterus:     Uterus: firm, normal sized and nontender, midplane in position.   Adnexa:     No adnexal tenderness present, no adnexal masses present  Perineum:     Perineum within normal limits, no evidence of trauma, no rashes or skin lesions present  Anus:     Anus within normal limits, no hemorrhoids present  Inguinal Lymph Nodes:     No lymphadenopathy present  Pubic Hair:     Normal pubic hair distribution for age  Genitalia and Groin:     No rashes present, no lesions present, no areas of discoloration, no masses present        BMI: Body mass index is 18.01 kg/m .    ASSESSMENT:  50 year old female with satisfactory annual exam.    ICD-10-CM    1. Encounter for gynecological examination without abnormal finding  Z01.419 Pap thin layer screen with HPV - recommended age 30 - 65 years   2. Premature menopause  E28.319        PLAN:      Karley oRss MD

## 2021-09-01 LAB — DEPRECATED CALCIDIOL+CALCIFEROL SERPL-MC: 40 UG/L (ref 20–75)

## 2021-09-01 ASSESSMENT — ANXIETY QUESTIONNAIRES: GAD7 TOTAL SCORE: 21

## 2021-09-02 ENCOUNTER — TELEPHONE (OUTPATIENT)
Dept: OBGYN | Facility: CLINIC | Age: 51
End: 2021-09-02

## 2021-09-02 LAB
BKR LAB AP GYN ADEQUACY: NORMAL
BKR LAB AP GYN INTERPRETATION: NORMAL
BKR LAB AP HPV REFLEX: NORMAL
BKR LAB AP PREVIOUS ABNORMAL: NORMAL
PATH REPORT.COMMENTS IMP SPEC: NORMAL
PATH REPORT.RELEVANT HX SPEC: NORMAL

## 2021-09-02 NOTE — TELEPHONE ENCOUNTER
"Pt calling as she saw her Pap smear results - NIL but read that HPV \"yes\".    Informed pt that HPV results still pending. The result statement \"  HPV Reflex  Yes regardless of result      Means to run HPV regardless of her pap result - which is still pending and no result.    Reviewed HPV hx at length w pt in the meantime to answer questions.  Pt verbalized understanding, in agreement with plan, and voiced no further questions.  Pinky Karimi RN on 9/2/2021 at 11:42 AM    "

## 2021-09-03 LAB
HUMAN PAPILLOMA VIRUS 16 DNA: NEGATIVE
HUMAN PAPILLOMA VIRUS 18 DNA: NEGATIVE
HUMAN PAPILLOMA VIRUS FINAL DIAGNOSIS: NORMAL
HUMAN PAPILLOMA VIRUS OTHER HR: NEGATIVE

## 2021-09-05 ENCOUNTER — HEALTH MAINTENANCE LETTER (OUTPATIENT)
Age: 51
End: 2021-09-05

## 2022-03-28 ENCOUNTER — HOSPITAL ENCOUNTER (EMERGENCY)
Facility: CLINIC | Age: 52
Discharge: HOME | End: 2022-03-28
Attending: NURSE PRACTITIONER | Admitting: NURSE PRACTITIONER
Payer: COMMERCIAL

## 2022-03-28 ENCOUNTER — APPOINTMENT (OUTPATIENT)
Dept: CT IMAGING | Facility: CLINIC | Age: 52
End: 2022-03-28
Attending: NURSE PRACTITIONER
Payer: COMMERCIAL

## 2022-03-28 VITALS
SYSTOLIC BLOOD PRESSURE: 125 MMHG | HEIGHT: 60 IN | TEMPERATURE: 98.2 F | HEART RATE: 74 BPM | WEIGHT: 90 LBS | BODY MASS INDEX: 17.67 KG/M2 | DIASTOLIC BLOOD PRESSURE: 68 MMHG | OXYGEN SATURATION: 100 % | RESPIRATION RATE: 18 BRPM

## 2022-03-28 DIAGNOSIS — S09.90XA CLOSED HEAD INJURY, INITIAL ENCOUNTER: ICD-10-CM

## 2022-03-28 DIAGNOSIS — S06.0X0A CONCUSSION WITHOUT LOSS OF CONSCIOUSNESS, INITIAL ENCOUNTER: ICD-10-CM

## 2022-03-28 PROCEDURE — 72125 CT NECK SPINE W/O DYE: CPT

## 2022-03-28 PROCEDURE — 70450 CT HEAD/BRAIN W/O DYE: CPT

## 2022-03-28 PROCEDURE — 99284 EMERGENCY DEPT VISIT MOD MDM: CPT | Mod: 25

## 2022-03-28 PROCEDURE — 250N000013 HC RX MED GY IP 250 OP 250 PS 637: Performed by: NURSE PRACTITIONER

## 2022-03-28 RX ORDER — ACETAMINOPHEN 500 MG
1000 TABLET ORAL ONCE
Status: COMPLETED | OUTPATIENT
Start: 2022-03-28 | End: 2022-03-28

## 2022-03-28 RX ORDER — ONDANSETRON 4 MG/1
4 TABLET, ORALLY DISINTEGRATING ORAL EVERY 8 HOURS PRN
Qty: 10 TABLET | Refills: 0 | Status: SHIPPED | OUTPATIENT
Start: 2022-03-28 | End: 2022-03-31

## 2022-03-28 RX ADMIN — ACETAMINOPHEN 1000 MG: 500 TABLET, FILM COATED ORAL at 16:05

## 2022-03-28 ASSESSMENT — ENCOUNTER SYMPTOMS
DIZZINESS: 1
APPETITE CHANGE: 1
NAUSEA: 1
NUMBNESS: 0
HEADACHES: 1
WEAKNESS: 0

## 2022-03-28 NOTE — ED TRIAGE NOTES
Pt had a fall in her bathroom last Saturday, first hitting her left jaw and then the back of her head. Pt did not lose consciousness. Pt was not evaluated medically. Today pt reports she has been feeling dizzy and is sensitive to sound. Pt states she is going to [place] on Thursday and wanted to be checked out before she leaves for vacation.

## 2022-03-28 NOTE — ED PROVIDER NOTES
"  History   Chief Complaint:  Head Injury       HPI   Maribel Mcgrath is a right-handed 51 year old female who presents with a head injury after a fall. Per the patient, nine days ago she spun around while brushing her teeth in her bathroom and the rug slipped out from under her, causing her to fall and hit her head. She hit her jaw and the back of her head. She denies loss of consciousness. Since then, she reports ongoing phonophobia, headache, dizziness, nausea, and appetite loss. She also notes feeling \"clumsy\", without focal numbness or weakness. She denies trismus, drainage from her ear or mouth. She denies alcohol use since the fall. She also denies numbness, weakness, visual disturbance. She has not taken any medications for her symptoms. She is traveling to [place] in three days and wanted to be evaluated prior to this.    Review of Systems   Constitutional: Positive for appetite change.   Eyes: Negative for visual disturbance.        Positive for phonophobia   Gastrointestinal: Positive for nausea.   Neurological: Positive for dizziness and headaches. Negative for weakness and numbness.        Positive for dropping things unintentionally    All other systems reviewed and are negative.    Allergies:  Atorvastatin    Medications:  The patient is not currently taking any prescribed medications.    Past Medical History:     Anxiety  Hyperlipidemia  Major depression  ADD    Past Surgical History:    Adenoidectomy  Bilateral mammoplasty, augmentation     Family History:    Mother: Hyperlipidemia, heart disease   Father: Hyperlipidemia, heart disease     Social History:  The patient was unaccompanied to the ER  Alcohol Use: Negative    Physical Exam     Patient Vitals for the past 24 hrs:   BP Temp Temp src Pulse Resp SpO2 Height Weight   03/28/22 1331 125/68 98.2  F (36.8  C) Temporal 74 18 100 % 1.511 m (4' 11.5\") 40.8 kg (90 lb)       Physical Exam  Nursing notes reviewed. Vitals reviewed.  General: Alert. " Well kept.  Eyes:  Conjunctiva non-injected, non-icteric.  Ears: No hemotympanums  Neck/Throat: Moist mucous membranes. Normal voice.  Cardiac: Regular rhythm. Normal heart sounds.  Pulmonary: Clear and equal breath sounds bilaterally.   Abdomen: soft and non-tender.  Musculoskeletal: Normal gross range of motion of all 4 extremities.  Mild tenderness along the right paraspinal musculature at C 2/3 with no midline thoracic or lumbar spinal tenderness.  Skin: Warm and dry. Normal appearance of visualized exposed skin without rashes or petechiae.  Psych: Affect normal. Good eye contact.  Neurological:  Mental: alert and oriented x 4, follows commands, no aphasia or dysarthria.   Cranial Nerves:  CN II: visual acuity - able to accurately count fingers with each eye. Visual fields intact  CN III, IV, VI: EOM intact, pupils equal and reactive  CN V: facial sensation nl  CN VII: face symmetric, no facial droop  CN VIII: hearing normal  CN IX: palate elevation symmetric, uvula at midline  CN XI SCM normal, shoulder shrug nl  CN XII: tongue midline  Motor: Strength: 5/5 in all major groups of all extremities. Normal tone. No abnormal movements. No pronator drift b/l.  Sensory: temperature, light touch intact.   Coordination: FNF tests intact b/l.   Gait:  Normal.    Emergency Department Course     Imaging:    Cervical spine CT w/o contrast   Final Result   IMPRESSION:    1. No evidence of acute fracture or subluxation in the cervical spine.   2. Degenerative changes in the cervical spine as detailed above, most   pronounced at C5-C6 where there is moderate to severe right neural   foraminal narrowing.          SAGRARIO JOLLEY MD            SYSTEM ID:  [id number]      Head CT w/o contrast   Final Result   IMPRESSION:   No evidence of acute intracranial hemorrhage, mass, or   herniation.         SAGRARIO JOLLEY MD            SYSTEM ID:  [id number]         The above imaging workup was performed.   Report per radiology    Laboratory:  Labs  "Ordered and Resulted from Time of ED Arrival to Time of ED Departure - No data to display     Emergency Department Course:    Reviewed:  I reviewed nursing notes, vitals and past medical history    Assessments:  1502 I obtained history and examined the patient as noted above.   1650 I rechecked the patient and explained findings.     Interventions:   1605: Tylenol, 1000 mg, Oral    Disposition:  The patient was discharged to home.     Impression & Plan   Medical Decision Making:  Maribel Mcgrath is a right-handed 51 year old female who presents with a head injury after a fall. This patient has a history and clinical exam consistent with concussion, which is a clinical diagnosis. The differential diagnosis includes skull fracture, epidural hematoma, subdural hematoma, intracerebral hemorrhage, and traumatic subarachnoid hemorrhage; these diagnoses were not detected during this visit on CT imaging. Despite the normal neuroimaging, the patient understands that they must return if any \"red flags\" appear/develop in the coming hours/days, as this may represent an indication to perform another CT scan. I have noted that \"red flags\" include: headaches that get worse, increased drowsiness, strange behavior, repetitive speech, seizures, repeated vomiting, growing confusion, increased irritability, slurred speech, weakness or numbness, and loss of responsiveness. Although rare, delayed CNS bleeds can occur, and the patient was notified of this. I have discussed the second impact syndrome, and the importance of not sustaining repeated concussions in the next 1-2 weeks. Post concussive syndrome is also discussed. Concussion information will also be provided in writing at discharge detailing this.  Cervical imaging also returns negative for acute trauma but does show some degenerative changes which were discussed with the patient.  No indication for admission or further work-up today.  She will be discharged with Zofran and " concussion  referral was placed.  The patients head to toe trauma exam is otherwise negative for serious underlying disease of the head, neck, chest, abdomen, extremities, pelvis  Diagnosis:    ICD-10-CM    1. Closed head injury, initial encounter  S09.90XA Concussion  Referral   2. Concussion without loss of consciousness, initial encounter  S06.0X0A Concussion  Referral       Discharge Medications:  New Prescriptions    ONDANSETRON (ZOFRAN ODT) 4 MG ODT TAB    Take 1 tablet (4 mg) by mouth every 8 hours as needed       Scribe Disclosure:  I, Carlito Justice, am serving as a scribe at 3:02 PM on 3/28/2022 to document services personally performed by Thi Hwang, SHELLY based on my observations and the provider's statements to me.        Thi Hwang, CNP  03/28/22 4388

## 2022-05-19 ENCOUNTER — TELEPHONE (OUTPATIENT)
Dept: NEUROLOGY | Facility: CLINIC | Age: 52
End: 2022-05-19

## 2022-05-19 ENCOUNTER — VIRTUAL VISIT (OUTPATIENT)
Dept: NEUROLOGY | Facility: CLINIC | Age: 52
End: 2022-05-19
Payer: COMMERCIAL

## 2022-05-19 DIAGNOSIS — S06.0X0A CONCUSSION WITHOUT LOSS OF CONSCIOUSNESS, INITIAL ENCOUNTER: ICD-10-CM

## 2022-05-19 DIAGNOSIS — M54.2 NECK PAIN: ICD-10-CM

## 2022-05-19 DIAGNOSIS — F07.81 POST CONCUSSION SYNDROME: Primary | ICD-10-CM

## 2022-05-19 DIAGNOSIS — R41.840 ATTENTION AND CONCENTRATION DEFICIT: ICD-10-CM

## 2022-05-19 DIAGNOSIS — F98.8 ADD (ATTENTION DEFICIT DISORDER) WITHOUT HYPERACTIVITY: ICD-10-CM

## 2022-05-19 DIAGNOSIS — H51.11 CONVERGENCE INSUFFICIENCY: ICD-10-CM

## 2022-05-19 DIAGNOSIS — S09.90XA CLOSED HEAD INJURY, INITIAL ENCOUNTER: ICD-10-CM

## 2022-05-19 DIAGNOSIS — R42 DIZZINESS: ICD-10-CM

## 2022-05-19 PROCEDURE — 99417 PROLNG OP E/M EACH 15 MIN: CPT | Mod: 95 | Performed by: NURSE PRACTITIONER

## 2022-05-19 PROCEDURE — 99205 OFFICE O/P NEW HI 60 MIN: CPT | Mod: 95 | Performed by: NURSE PRACTITIONER

## 2022-05-19 RX ORDER — METHYLPHENIDATE HYDROCHLORIDE 5 MG/1
5 TABLET ORAL 2 TIMES DAILY
Qty: 60 TABLET | Refills: 0 | Status: SHIPPED | OUTPATIENT
Start: 2022-05-19 | End: 2022-09-15

## 2022-05-19 RX ORDER — LISDEXAMFETAMINE DIMESYLATE 30 MG/1
30 CAPSULE ORAL EVERY MORNING
Qty: 30 CAPSULE | Refills: 0 | Status: SHIPPED | OUTPATIENT
Start: 2022-05-19 | End: 2022-06-30
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
## 2022-05-19 NOTE — TELEPHONE ENCOUNTER
Prior Authorization Retail Medication Request    Medication/Dose: lisdexamfetamine (VYVANSE) 30 MG capsule  ICD code (if different than what is on RX):  N/a  Previously Tried and Failed: N/a  Rationale:  N/a    Insurance Name:  [facility]   Insurance ID:  [id number]      Pharmacy Information (if different than what is on RX)  Name:  N/a  Phone:  N/a

## 2022-05-24 NOTE — TELEPHONE ENCOUNTER
Prior Authorization Not Needed per Insurance    Medication: lisdexamfetamine (VYVANSE) 30 MG capsule  Insurance Company: [employer] - Phone [phone] Fax [phone]  Expected CoPay:      Pharmacy Filling the Rx: [id number] - [place], [place] - [address] [facility]  Pharmacy Notified: Yes  Patient Notified: Yes    Drug is covered by current benefit plan. No further PA activity needed  Patient has picked up medication.

## 2022-06-30 DIAGNOSIS — F07.81 POST CONCUSSION SYNDROME: ICD-10-CM

## 2022-06-30 DIAGNOSIS — F98.8 ADD (ATTENTION DEFICIT DISORDER) WITHOUT HYPERACTIVITY: ICD-10-CM

## 2022-06-30 DIAGNOSIS — R41.840 ATTENTION AND CONCENTRATION DEFICIT: ICD-10-CM

## 2022-06-30 RX ORDER — LISDEXAMFETAMINE DIMESYLATE 30 MG/1
30 CAPSULE ORAL EVERY MORNING
Qty: 30 CAPSULE | Refills: 0 | Status: SHIPPED | OUTPATIENT
Start: 2022-06-30 | End: 2022-08-26

## 2022-06-30 NOTE — TELEPHONE ENCOUNTER
Pt was scheduled for a Video visit with you today but cannot keep appt so I had rescheduled her for 7/19. Per pt asked that if you can refill her Vyvanse as she have only a few left.      Medication T'd for review and signature    SILVIA Garcia on 6/30/2022 at 9:29 AM

## 2022-07-19 ENCOUNTER — VIRTUAL VISIT (OUTPATIENT)
Dept: NEUROLOGY | Facility: CLINIC | Age: 52
End: 2022-07-19
Payer: COMMERCIAL

## 2022-07-19 DIAGNOSIS — G47.00 INSOMNIA, UNSPECIFIED TYPE: ICD-10-CM

## 2022-07-19 DIAGNOSIS — R41.840 ATTENTION AND CONCENTRATION DEFICIT: ICD-10-CM

## 2022-07-19 DIAGNOSIS — F07.81 POST CONCUSSION SYNDROME: Primary | ICD-10-CM

## 2022-07-19 PROCEDURE — 99214 OFFICE O/P EST MOD 30 MIN: CPT | Mod: 95 | Performed by: NURSE PRACTITIONER

## 2022-07-19 RX ORDER — BUPROPION HYDROCHLORIDE 150 MG/1
150 TABLET ORAL EVERY MORNING
Qty: 30 TABLET | Refills: 3 | Status: SHIPPED | OUTPATIENT
Start: 2022-07-19 | End: 2022-09-15

## 2022-07-19 NOTE — LETTER
"    7/19/2022         RE: Maribel Mcgrath  [address] [facility] [address]        Dear Colleague,    Thank you for referring your patient, Maribel Mcgrath, to the [facility]. Please see a copy of my visit note below.        How would you like to obtain your AVS? Mychart  If the video is dropped, the invitation should be resent by:             Video Visit: Concussion Follow up:     Maribel Mcgrath is a 51 year old female who is being evaluated via a billable video visit       The patient has been notified of the following:     \"This video visit will be conducted via a video call between you and your provider. We have found that certain health care needs can be provided without the need for a physical exam.  This service lets us provide the care you need with a short video conversation.  If a prescription is necessary we can send it directly to your pharmacy.  If lab work is needed we can place an order for that and you can then stop by our lab to have the test done at a later time.    If during the course of the call the provider feels a video visit is not appropriate, you will not be charged for this service.\"     Patient has given verbal consent to a video visit? Yes      Visit Check In:     Orders from previous visit: Vyvance, Ritalin, PT   Neuropsychological assessment completed    No   Currently doing PT  No    Completed No   OT    Completed No   ST      Completed No   Psychology  No   Return to Work/School   Full scheduled hours  Yes       Need a note for accommodations  No     Currently on medication to help with sleep    No        Currently on any mental health medications     No          Currently on medication for attention, ADD/ADHD    Yes    Vyvance and Ritalin                                                      Workman's Comp   No     Outpatient Follow up Mild TBI (Concussion)  Evaluation:     Maribel Mcgrath chief complaint is Post Concussion " "Syndrome     Is patient on a controlled substance prescribed by me?  Yes    checked    Yes   Follow up appointment       HPI:        Pertinent History:  Per Pt EMR: Maribel Mcgrath is a right-handed 51 year old female who presents with a head injury after a fall. Per the patient, nine days ago she spun around while brushing her teeth in her bathroom and the rug slipped out from under her, causing her to fall and hit her head. She hit her jaw and the back of her head. She denies loss of consciousness. Since then, she reports ongoing phonophobia, headache, dizziness, nausea, and appetite loss. She also notes feeling \"clumsy\", without   focal numbness or weakness. She denies trismus, drainage from her ear or mouth. She denies alcohol use since the fall. She also denies numbness, weakness, visual disturbance. She has not taken any medications for her symptoms. She is traveling to [place] in three days and wanted to be evaluated prior to this.    Plan:          We discussed some treatment options and have elected to   Order Wellbutrin and Amitriptyline, send message for PT to be rescheduled. .    Medication Adjustment:  Wellbutrin 150 mg, take one tab PO every am  Amitriptyline 25 mg, take 1-2 tabs PO every HS    Return to Work/School   Full scheduled hours  Yes       Note completed    No      Return to clinic 8 weeks    Continue with the support of the clinic, reassurance, and redirection. Staff monitoring and ongoing assessments per team plan. This team will utilize appropriate emergency services if necessary. I will make myself available if concerns or problems arise.  The patient agrees to call/message before her next visit with any questions, concerns or problems.    Progress Note:        The patient returns to the concussion clinic for a follow up visit, She was last seen by me on 5/19/2022, patient was started on Vyvanse and Ritalin.  Patient never received the Wellbutrin and amitriptyline so will be " reordered today.  Patient was also scheduled for physical therapy but it got canceled on the day she was supposed to attend and this has not been rescheduled for her yet.  The patient is just getting over COVID which has made her dizziness worse.  Patient reports that she was feeling much better but since coming down with COVID symptoms have worsened.,        Subjective:          Overall improvement from last visit   No     Headaches:  Significant ongoing headaches Yes   Headaches: Intermittently  Improvement :No   Current Headache Yes   Wake with HA  Yes     Physical Symptoms:  Headache-Yes     Since last visit  Worsen     Nausea-No           Balance problems - Yes    Since last visit  Worsen      Dizziness - Yes          Since last visit  Worsen     Visual problems - Yes    Since last visit  Same     Fatigue - Yes              Since last visit  Worsen     Sensitivity to light - Yes        Since last visit  Same     Sensitivity to sound - Yes       Since last visit  Same     Numbness/tingling - No          Cognitive Symptoms  Feeling mentally foggy -Yes        Since last visit  Same     Feeling slowed down -Yes        Since last visit  Same     Difficulty Concentrating- Yes      Since last visit  Same     Difficulty remembering - Yes        Since last visit  Same       Emotional Symptoms  Irritability - Yes        Since last visit  Same     Sadness-  Yes      Since last visit  Same     More emotional - Yes       Since last visit  Same     Nervousness/anxiety -Yes       Since last visit  Same       Sleep History:  Sleep less than usual - No    Sleep more than usual - Yes    Trouble falling asleep - No       Trouble staying asleep - Yes       Since last visit  Same     Wake feeling rested - No         Since last visit  Worsen            Exertion:         Do the above stated symptoms worsen with physical activity? Yes        Since last visit  Worsen           Do the above stated symptoms worsen with cognitive  activity? Yes       Since last visit  Worsen              Objective:          Patient Active Problem List    Diagnosis Date Noted     ADD (attention deficit disorder) without hyperactivity 08/31/2021     Priority: Medium     Mixed hyperlipidemia 08/31/2021     Priority: Medium     Hyperopia, bilateral 08/12/2021     Priority: Medium     Presbyopia 08/12/2021     Priority: Medium     Depression, major, single episode, moderate (H) 10/16/2019     Priority: Medium     Past Medical History:   Diagnosis Date     Anxiety      Herpes genitalis      Hyperlipidemia      Major depression     Has seen neuro due to light headedness, nausea, palpitations. Tried zoloft, paxil (did not like paxil).      Menopause present 2017    FSH very elevated  157     Past Surgical History:   Procedure Laterality Date     ADENOIDECTOMY       COSMETIC MAMMOPLASTY AUGMENTATION BILATERAL  2011     Family History   Problem Relation Age of Onset     Diabetes Maternal Grandmother      Hyperlipidemia Mother      Heart Disease Mother      Hyperlipidemia Father      Heart Disease Father      Current Outpatient Medications   Medication Sig Dispense Refill     amitriptyline (ELAVIL) 25 MG tablet Take 1-2 tablets (25-50 mg) by mouth At Bedtime 60 tablet 3     buPROPion (WELLBUTRIN XL) 150 MG 24 hr tablet Take 1 tablet (150 mg) by mouth every morning 30 tablet 3     lisdexamfetamine (VYVANSE) 30 MG capsule Take 1 capsule (30 mg) by mouth every morning 30 capsule 0     methylphenidate (RITALIN) 5 MG tablet Take 1 tablet (5 mg) by mouth 2 times daily 60 tablet 0     Social History     Socioeconomic History     Marital status: Single     Spouse name: Not on file     Number of children: Not on file     Years of education: Not on file     Highest education level: Not on file   Occupational History     Employer: [employer]   Tobacco Use     Smoking status: Never Smoker     Smokeless tobacco: Never Used   Substance and Sexual Activity     Alcohol use: Yes      Alcohol/week: 0.0 standard drinks     Drug use: No     Sexual activity: Not Currently     Birth control/protection: Post-menopausal   Other Topics Concern     Parent/sibling w/ CABG, MI or angioplasty before 65F 55M? Not Asked   Social History Narrative     Not on file     Social Determinants of Health     Financial Resource Strain: Not on file   Food Insecurity: Not on file   Transportation Needs: Not on file   Physical Activity: Not on file   Stress: Not on file   Social Connections: Not on file   Intimate Partner Violence: Not on file   Housing Stability: Not on file     ALLERGIES  Atorvastatin    The following portions of the patient's history were reviewed and updated as appropriate: allergies, current medications, past family history, past medical history, past social history, past surgical history and problem list.    Review of Systems  A comprehensive review of systems was negative except for: What is noted above    Mental Status Examination  Alertness:  alert  and oriented  Appearance:  well groomed  Behavior/Demeanor:  cooperative, pleasant and calm, with good  eye contact.  Speech:  normal  Psychomotor:  normal or unremarkable    Mood:  good  Affect:  appropriate and was congruent to speech content.  Thought Process/Associations: unremarkable   Thought Content: devoid of  suicidal and violent ideation and delusions.   Perception: devoid of  hallucinations  Insight:  good.  Judgment: good.  Attention/Concentration:  Normal  Language:  Intact  Fund of Knowledge:  Average.    Memory:  Immediate recall intact, Short-term memory intact and Long-term memory intact.       Counseling:   Discussion was held with the patient today regarding concussion in general including types of injury, symptoms that are common, treatment and variability in time to recover  I have reassured the patient her symptoms are very common when a concussion is present and will improve with time. We discussed the risks and benefits of  possible medication used to help concussive symptoms including risk of worsening depression with medication adjustments and even the possibility of emergence of suicidal ideations. We will assess for the appropriateness of possible psychotropic medication trials/changes. The patient will seek out appropriate emergency services should that become necessary. The patient agrees to call/message before her next visit with any questions, concerns or problems.    Visit Details:     Type of service: Video Visit    Video Start Time: 1011    Video End Time:  1036    Originating Location: Patient's home    Distant Location:  [facility]  [place]    Mode of Communication: Video Conference via  [employer]     Diagnosis managed and treated at today's visit :  Post concussion syndrome  Post concussion headache  Nausea  Dizziness  Fatigue  Insomnia  Sensitivity to light  Sound sensitivity  Concentration and Attention deficit  Memory difficulties  Anxiety d/t a medical condition  Irritability  Return to work    Total time today (30 min) in this patient encounter was spent on pre-charting, chart review, review of outside records, review of test results, interpretation of tests, patient visit and documentation and counseling and/or coordination of care. The patient is in agreement with this plan and has no further questions.    General Information:     Today you had your appointment with Aydee Delgadillo CNP     If lab work was done today as part of your evaluation you will generally be contacted via My Chart, mail, or phone with the results within 1-5 days. If there is an alarming result we will contact you by phone. Lab results come back at varying times, I generally wait until all labs are resulted before making comments on results. Please note labs are automatically released to My Chart once available.     If you need refills please contact your pharmacist. They will send a refill request to me to  review. If it is a controlled substance please message me through [facility]. Please allow 3 business days for us to process all refill requests.     Please call or send a medical message through My Chart, with any questions or concerns    If you need any paperwork completed please fax forms to [phone]. Please state if you would like a copy of the completed paperwork, mailed or faxed back to the patient and a fax number to fax the paperwork to. Please allow up to 10 business days for paperwork to be completed.      ANISH German, CNP      [facility]  [facility] [address]  [address] [place] [id number]  Office: [phone]  Fax: [phone]          Again, thank you for allowing me to participate in the care of your patient.        Sincerely,        ANISH German CNP

## 2022-07-19 NOTE — NURSING NOTE
Chief Complaint   Patient presents with     Concussion     Follow up visit  Talk about medications

## 2022-07-20 NOTE — PROGRESS NOTES
"Video Visit: Concussion Follow up:     Maribel Mcgrath is a 51 year old female who is being evaluated via a billable video visit       The patient has been notified of the following:     \"This video visit will be conducted via a video call between you and your provider. We have found that certain health care needs can be provided without the need for a physical exam.  This service lets us provide the care you need with a short video conversation.  If a prescription is necessary we can send it directly to your pharmacy.  If lab work is needed we can place an order for that and you can then stop by our lab to have the test done at a later time.    If during the course of the call the provider feels a video visit is not appropriate, you will not be charged for this service.\"     Patient has given verbal consent to a video visit? Yes      Visit Check In:     Orders from previous visit: Vyvance, Ritalin, PT   Neuropsychological assessment completed    No   Currently doing PT  No    Completed No   OT    Completed No   ST      Completed No   Psychology  No   Return to Work/School   Full scheduled hours  Yes       Need a note for accommodations  No     Currently on medication to help with sleep    No        Currently on any mental health medications     No          Currently on medication for attention, ADD/ADHD    Yes    Vyvance and Ritalin                                                      Workman's Comp   No     Outpatient Follow up Mild TBI (Concussion)  Evaluation:     Maribel Mcgrath chief complaint is Post Concussion Syndrome     Is patient on a controlled substance prescribed by me?  Yes    checked    Yes   Follow up appointment       HPI:        Pertinent History:  Per Pt EMR: Maribel Mcgrath is a right-handed 51 year old female who presents with a head injury after a fall. Per the patient, nine days ago she spun around while brushing her teeth in her bathroom and the rug slipped out from under " "her, causing her to fall and hit her head. She hit her jaw and the back of her head. She denies loss of consciousness. Since then, she reports ongoing phonophobia, headache, dizziness, nausea, and appetite loss. She also notes feeling \"clumsy\", without   focal numbness or weakness. She denies trismus, drainage from her ear or mouth. She denies alcohol use since the fall. She also denies numbness, weakness, visual disturbance. She has not taken any medications for her symptoms. She is traveling to [place] in three days and wanted to be evaluated prior to this.    Plan:          We discussed some treatment options and have elected to   Order Wellbutrin and Amitriptyline, send message for PT to be rescheduled. .    Medication Adjustment:  Wellbutrin 150 mg, take one tab PO every am  Amitriptyline 25 mg, take 1-2 tabs PO every HS    Return to Work/School   Full scheduled hours  Yes       Note completed    No      Return to clinic 8 weeks    Continue with the support of the clinic, reassurance, and redirection. Staff monitoring and ongoing assessments per team plan. This team will utilize appropriate emergency services if necessary. I will make myself available if concerns or problems arise.  The patient agrees to call/message before her next visit with any questions, concerns or problems.    Progress Note:        The patient returns to the concussion clinic for a follow up visit, She was last seen by me on 5/19/2022, patient was started on Vyvanse and Ritalin.  Patient never received the Wellbutrin and amitriptyline so will be reordered today.  Patient was also scheduled for physical therapy but it got canceled on the day she was supposed to attend and this has not been rescheduled for her yet.  The patient is just getting over COVID which has made her dizziness worse.  Patient reports that she was feeling much better but since coming down with COVID symptoms have worsened.,        Subjective:          Overall " improvement from last visit   No     Headaches:  Significant ongoing headaches Yes   Headaches: Intermittently  Improvement :No   Current Headache Yes   Wake with HA  Yes     Physical Symptoms:  Headache-Yes     Since last visit  Worsen     Nausea-No           Balance problems - Yes    Since last visit  Worsen      Dizziness - Yes          Since last visit  Worsen     Visual problems - Yes    Since last visit  Same     Fatigue - Yes              Since last visit  Worsen     Sensitivity to light - Yes        Since last visit  Same     Sensitivity to sound - Yes       Since last visit  Same     Numbness/tingling - No          Cognitive Symptoms  Feeling mentally foggy -Yes        Since last visit  Same     Feeling slowed down -Yes        Since last visit  Same     Difficulty Concentrating- Yes      Since last visit  Same     Difficulty remembering - Yes        Since last visit  Same       Emotional Symptoms  Irritability - Yes        Since last visit  Same     Sadness-  Yes      Since last visit  Same     More emotional - Yes       Since last visit  Same     Nervousness/anxiety -Yes       Since last visit  Same       Sleep History:  Sleep less than usual - No    Sleep more than usual - Yes    Trouble falling asleep - No       Trouble staying asleep - Yes       Since last visit  Same     Wake feeling rested - No         Since last visit  Worsen            Exertion:         Do the above stated symptoms worsen with physical activity? Yes        Since last visit  Worsen           Do the above stated symptoms worsen with cognitive activity? Yes       Since last visit  Worsen              Objective:          Patient Active Problem List    Diagnosis Date Noted     ADD (attention deficit disorder) without hyperactivity 08/31/2021     Priority: Medium     Mixed hyperlipidemia 08/31/2021     Priority: Medium     Hyperopia, bilateral 08/12/2021     Priority: Medium     Presbyopia 08/12/2021     Priority: Medium     Depression,  major, single episode, moderate (H) 10/16/2019     Priority: Medium     Past Medical History:   Diagnosis Date     Anxiety      Herpes genitalis      Hyperlipidemia      Major depression     Has seen neuro due to light headedness, nausea, palpitations. Tried zoloft, paxil (did not like paxil).      Menopause present 2017    FSH very elevated  157     Past Surgical History:   Procedure Laterality Date     ADENOIDECTOMY       COSMETIC MAMMOPLASTY AUGMENTATION BILATERAL  2011     Family History   Problem Relation Age of Onset     Diabetes Maternal Grandmother      Hyperlipidemia Mother      Heart Disease Mother      Hyperlipidemia Father      Heart Disease Father      Current Outpatient Medications   Medication Sig Dispense Refill     amitriptyline (ELAVIL) 25 MG tablet Take 1-2 tablets (25-50 mg) by mouth At Bedtime 60 tablet 3     buPROPion (WELLBUTRIN XL) 150 MG 24 hr tablet Take 1 tablet (150 mg) by mouth every morning 30 tablet 3     lisdexamfetamine (VYVANSE) 30 MG capsule Take 1 capsule (30 mg) by mouth every morning 30 capsule 0     methylphenidate (RITALIN) 5 MG tablet Take 1 tablet (5 mg) by mouth 2 times daily 60 tablet 0     Social History     Socioeconomic History     Marital status: Single     Spouse name: Not on file     Number of children: Not on file     Years of education: Not on file     Highest education level: Not on file   Occupational History     Employer: [employer]   Tobacco Use     Smoking status: Never Smoker     Smokeless tobacco: Never Used   Substance and Sexual Activity     Alcohol use: Yes     Alcohol/week: 0.0 standard drinks     Drug use: No     Sexual activity: Not Currently     Birth control/protection: Post-menopausal   Other Topics Concern     Parent/sibling w/ CABG, MI or angioplasty before 65F 55M? Not Asked   Social History Narrative     Not on file     Social Determinants of Health     Financial Resource Strain: Not on file   Food Insecurity: Not on file   Transportation Needs:  Not on file   Physical Activity: Not on file   Stress: Not on file   Social Connections: Not on file   Intimate Partner Violence: Not on file   Housing Stability: Not on file     ALLERGIES  Atorvastatin    The following portions of the patient's history were reviewed and updated as appropriate: allergies, current medications, past family history, past medical history, past social history, past surgical history and problem list.    Review of Systems  A comprehensive review of systems was negative except for: What is noted above    Mental Status Examination  Alertness:  alert  and oriented  Appearance:  well groomed  Behavior/Demeanor:  cooperative, pleasant and calm, with good  eye contact.  Speech:  normal  Psychomotor:  normal or unremarkable    Mood:  good  Affect:  appropriate and was congruent to speech content.  Thought Process/Associations: unremarkable   Thought Content: devoid of  suicidal and violent ideation and delusions.   Perception: devoid of  hallucinations  Insight:  good.  Judgment: good.  Attention/Concentration:  Normal  Language:  Intact  Fund of Knowledge:  Average.    Memory:  Immediate recall intact, Short-term memory intact and Long-term memory intact.       Counseling:   Discussion was held with the patient today regarding concussion in general including types of injury, symptoms that are common, treatment and variability in time to recover  I have reassured the patient her symptoms are very common when a concussion is present and will improve with time. We discussed the risks and benefits of possible medication used to help concussive symptoms including risk of worsening depression with medication adjustments and even the possibility of emergence of suicidal ideations. We will assess for the appropriateness of possible psychotropic medication trials/changes. The patient will seek out appropriate emergency services should that become necessary. The patient agrees to call/message before her  next visit with any questions, concerns or problems.    Visit Details:     Type of service: Video Visit    Video Start Time: 1011    Video End Time:  1036    Originating Location: Patient's home    Distant Location:  [facility]  [place]    Mode of Communication: Video Conference via  [facility]     Diagnosis managed and treated at today's visit :  Post concussion syndrome  Post concussion headache  Nausea  Dizziness  Fatigue  Insomnia  Sensitivity to light  Sound sensitivity  Concentration and Attention deficit  Memory difficulties  Anxiety d/t a medical condition  Irritability  Return to work    Total time today (30 min) in this patient encounter was spent on pre-charting, chart review, review of outside records, review of test results, interpretation of tests, patient visit and documentation and counseling and/or coordination of care. The patient is in agreement with this plan and has no further questions.    General Information:     Today you had your appointment with Aydee Delgadillo CNP     If lab work was done today as part of your evaluation you will generally be contacted via My Chart, mail, or phone with the results within 1-5 days. If there is an alarming result we will contact you by phone. Lab results come back at varying times, I generally wait until all labs are resulted before making comments on results. Please note labs are automatically released to My Chart once available.     If you need refills please contact your pharmacist. They will send a refill request to me to review. If it is a controlled substance please message me through [facility]. Please allow 3 business days for us to process all refill requests.     Please call or send a medical message through My Chart, with any questions or concerns    If you need any paperwork completed please fax forms to [phone]. Please state if you would like a copy of the completed paperwork, mailed or faxed back to the patient  and a fax number to fax the paperwork to. Please allow up to 10 business days for paperwork to be completed.      ANISH German, [facility]  [facility] [address]  [address] [place] [id number]  Office: [phone]  Fax: [phone]

## 2022-07-21 ENCOUNTER — HOSPITAL ENCOUNTER (OUTPATIENT)
Dept: PHYSICAL THERAPY | Facility: CLINIC | Age: 52
Discharge: HOME | End: 2022-07-21
Payer: COMMERCIAL

## 2022-07-21 DIAGNOSIS — R26.89 IMPAIRMENT OF BALANCE: ICD-10-CM

## 2022-07-21 DIAGNOSIS — M54.2 NECK PAIN: ICD-10-CM

## 2022-07-21 DIAGNOSIS — F07.81 POST CONCUSSION SYNDROME: Primary | ICD-10-CM

## 2022-07-21 PROCEDURE — 97161 PT EVAL LOW COMPLEX 20 MIN: CPT | Mod: GP | Performed by: PHYSICAL THERAPIST

## 2022-07-31 NOTE — PROGRESS NOTES
07/21/22 1400   Quick Adds   Quick Adds Vestibular Eval   Type of Visit Initial OP PT Evaluation   General Information   Start of Care Date 07/21/22   Referring Physician Aydee Delgadillo NP   Orders Evaluate and Treat as Indicated   Order Date 05/19/22   Medical Diagnosis Post concussive syndrome, neck pain, dizziness, convergence insuffiency   Onset of illness/injury or Date of Surgery 03/26/22   Surgical/Medical history reviewed Yes   Pertinent history of current problem (include personal factors and/or comorbidities that impact the POC) Maribel Mcgrath is a right-handed 51 year old female who presents with a head injury after a fall. Per the patient, she spun around while brushing her teeth in her bathroom and the rug slipped out from under her, causing her to fall and hit her head. She hit her jaw and the back of her head. She denies loss of consciousness. Ongoing symptoms: Headaches: described headaches as dull, daily and increased intensity in the morning. Her headaches improved during the day, but will take Advil. Located on the right side of her head. She clipped her jaw on the door handle while she fell. She had a history of TMJ and had surgery.  She notes increased jaw issues since.  Since she had covid, her headaches have increased, horrible in the morning, she wakes up feeling like she is hungover. Pain is elevated and pain the back of her head. She was up for a few hours, she stretched her neck, massaging it, neck rotating.  Limits screen. Eyes are blurry.  Average headache: 5/10, Best: 0/10, Worst: 8/10.  Neck pain: located at the base of her skull, tenderness on right side, described as stiff and feeling like it is misaligned. Aggravated by unable to determine.  Alleviated by Advil.   Current: 2/10,  Worst: 5/10.  Dizziness: described as feeling off balance, wobbly. Occurs upon initial standing or moving too quickly as making a turn.  At times feels floaty   Patient/Family Goals Statement  improve neck pain, headaches and balance   Fall Risk Screen   Fall screen completed by PT   Have you fallen 2 or more times in the past year? No   Have you fallen and had an injury in the past year? Yes   Is patient a fall risk? No   Abuse Screen (yes response referral indicated)   Feels Unsafe at Home or Work/School no   Feels Threatened by Someone no   Does Anyone Try to Keep You From Having Contact with Others or Doing Things Outside Your Home? no   Physical Signs of Abuse Present no   System Outcome Measures   Outcome Measures Concussion (see Concussion Symptom Assessment)  (43)   Pain   Pain comments see above   Cognitive Status Examination   Orientation orientation to person, place and time   Level of Consciousness alert   Posture   Posture Forward head position   Palpation   Palpation increased tone in B upper traps, B levator scapulas, suboccipitals, rhomobids. mid and lower cervical segmental mobility: WNL supine neck flexor endurance test: 4 seconds   Bed Mobility   Bed Mobility Comments Independent   Transfer Skills   Transfer Comments Independent   Gait   Gait Comments reciprocal gait   Gait Special Tests   Gait Special Tests FUNCTIONAL GAIT ASSESSMENT   Gait Special Tests Functional Gait Assessment Score out of 30   Score out of 30 25   Comments <22/30 indicates fall risk   Balance Special Tests Modified CTSIB Conditions   Condition 1, seconds 30 Seconds   Condition 2, seconds 30 Seconds   Condition 4, seconds 30 Seconds   Condition 5, seconds 30 Seconds   Modified CTSIB Comments increased sway with condition 5, but no LOB   Sensory Examination   Sensory Perception no deficits were identified   Coordination   Coordination no deficits were identified   Cervicogenic Screen   Neck ROM WFL   Oculomotor Exam   Smooth Pursuit Normal   Saccades Normal   VOR Normal   VOR Cancellation Normal   Rapid Head Thrust Normal   Planned Therapy Interventions   Planned Therapy Interventions manual  therapy;strengthening;stretching;neuromuscular re-education;other (see comments)  (self care/home management)   Clinical Impression   Criteria for Skilled Therapeutic Interventions Met yes, treatment indicated   PT Diagnosis neck pain, impaired balance   Influenced by the following impairments unsteady balance on foam and/or eyes closed, cervical mm imbalance: DNF weakness and increased mm tone of posterior cervical mms   Functional limitations due to impairments decreased tolerance for work and daily activities leading to needing more rest breaks for balance   Clinical Presentation Stable/Uncomplicated   Clinical Presentation Rationale presentation, flare up of symptoms s/p COVID, mCTSIB, DNF edurnace test   Clinical Decision Making (Complexity) Low complexity   Therapy Frequency other (see comments)  (every other week)   Predicted Duration of Therapy Intervention (days/wks) up to 90 days   Risk & Benefits of therapy have been explained Yes   Patient, Family & other staff in agreement with plan of care Yes   Education Assessment   Preferred Learning Style Listening;Demonstration;Pictures/video   Barriers to Learning No barriers   GOALS   PT Eval Goals 1;2;3;4   Goal 1   Goal Identifier FGA   Goal Description Pt will improve FGA to >/=28/30 in order to ease balance and stability   Goal Progress Baseline 25/30   Target Date 10/19/22   Goal 2   Goal Identifier DNF endurance test   Goal Description Pt will tolerate >/=10 seconds of DNF endurance test in order to assist with reduced neck pain   Goal Progress Baseline 4 seconds   Target Date 10/19/22   Goal 3   Goal Identifier Headache frqeuency   Goal Description Pt will report headache frequency <4x/wk in order to tolerance more daily activities   Target Date 10/19/22   Goal 4   Goal Identifier HEP   Goal Description Pt will be indpendent with a HEP to self manage symptoms   Target Date 10/19/22   Total Evaluation Time   PT Eval, Low Complexity Minutes (70580) 40

## 2022-08-11 ENCOUNTER — HOSPITAL ENCOUNTER (OUTPATIENT)
Dept: OCCUPATIONAL THERAPY | Facility: CLINIC | Age: 52
Discharge: HOME | End: 2022-08-11
Attending: NURSE PRACTITIONER
Payer: COMMERCIAL

## 2022-08-11 DIAGNOSIS — Z78.9 IMPAIRED INSTRUMENTAL ACTIVITIES OF DAILY LIVING (IADL): ICD-10-CM

## 2022-08-11 DIAGNOSIS — F07.81 POST CONCUSSION SYNDROME: Primary | ICD-10-CM

## 2022-08-11 PROCEDURE — 97165 OT EVAL LOW COMPLEX 30 MIN: CPT | Mod: GO | Performed by: OCCUPATIONAL THERAPIST

## 2022-08-11 PROCEDURE — 97535 SELF CARE MNGMENT TRAINING: CPT | Mod: GO | Performed by: OCCUPATIONAL THERAPIST

## 2022-08-16 ENCOUNTER — HOSPITAL ENCOUNTER (OUTPATIENT)
Dept: PHYSICAL THERAPY | Facility: CLINIC | Age: 52
Discharge: HOME | End: 2022-08-16
Payer: COMMERCIAL

## 2022-08-16 DIAGNOSIS — M54.2 NECK PAIN: Primary | ICD-10-CM

## 2022-08-16 PROCEDURE — 97110 THERAPEUTIC EXERCISES: CPT | Mod: GP | Performed by: PHYSICAL THERAPIST

## 2022-08-16 NOTE — PROGRESS NOTES
"   08/11/22 1200   Quick Adds   Quick Adds Certification;Concussion   Type of Visit Initial Outpatient Occupational Therapy Evaluation   General Information   Start Of Care Date 08/11/22   Referring Physician Aydee Delgadillo APRN CNP   Orders Evaluate and treat as indicated   Other Orders PT   Orders Date 07/31/22   Medical Diagnosis Post Concussion syndrom   Onset of Illness/Injury or Date of Surgery 07/31/22  (concussion in March)   Surgical/Medical History Reviewed Yes   Additional Occupational Profile Info/Pertinent History of Current Problem Maribel Mcgrath is a right-handed 51 year old female who presents with a head injury after a fall. Per the patient, she spun around while brushing her teeth in her bathroom and the rug slipped out from under her, causing her to fall and hit her head. She hit her jaw and the back of her head. She denies loss of consciousness. Ongoing symptoms: Headaches: described headaches as dull, daily and increased intensity in the morning. Her headaches improved during the day, but will take Advil. Located on the right side of her head. She clipped her jaw on the door handle while she fell. She had a history of TMJ and had surgery.  She notes increased jaw issues since.  Since she had covid, her headaches have increased, horrible in the morning, she wakes up feeling like she is hungover. Pain is elevated and pain the back of her head. She was up for a few hours, she stretched her neck, massaging it, neck rotating.  Limits screen. Eyes are blurry.  Average headache: 5/10, Best: 0/10, Worst: 8/10.  Neck pain: located at the base of her skull, tenderness on right side, described as stiff and feeling like it is misaligned. Aggravated by unable to determine.  Alleviated by Advil.   Current: 2/10,  Worst: 5/10.  Dizziness: described as feeling off balance, wobbly. Occurs upon initial standing or moving too quickly as making a turn.  At times feels floaty   Comments/Observations \"I feel " "like I am still in a fog, forgetting things.\"  Patient reports showing up for OT appointment yesterday, got a reminder on her phone but did not pay attention to the date.   Role/Living Environment   Patient role/Employment history Employed   Community/Avocational Activities Work: Realtor, hours vary during the week.  Hobbies: working out (but hasn't worked out since her concussion) has a [place], walks her dog 4x/day   Current Living Environment House   Prior Level - Transfers Independent   Prior Level - Ambulation Independent   Prior Level - ADLS Independent   Prior Responsibilities - IADL Meal Preparation;Housekeeping;Laundry;Shopping;Medication management;Finances;Driving;Work;Yardwork   Patient/family Goals Statement To improve focus, memory and concentration.   Vision Interview   Technology Used computer, phone   Technology Use Increases Symptoms No   Light Sensitivity/Glare Comments Denies light sensitivity indoors/outdoors   Fall Risk Screen   Fall screen completed by PT   Abuse Screen (yes response referral indicated)   Feels Unsafe at Home or Work/School no   Feels Threatened by Someone no   Does Anyone Try to Keep You From Having Contact with Others or Doing Things Outside Your Home? no   Physical Signs of Abuse Present no   Cognitive Status Examination   Orientation Orientation to person, place and time   Level of Consciousness Alert   Follows Commands and Answers Questions 100% of the time;Able to follow multistep instructions   Personal Safety and Judgment Intact   Memory Intact   Memory Comments MoCA: 28/30 (normal is 26+) with 1 point off for day of the week and serial 7's.   Attention Reports problems attending   Organization/Problem Solving Reports problems with organization;Reports problems with problem solving during evaluation   Cognitive Comment Reports misplacing things and is using airtags for her purse, keys, luggage and debit cards.  Patient uses a paper calendar, has alerts on her phone for " appts.   Visual Perception   Visual Perception No deficits were identified  (reading glasses.)   Visual Attention Intact.   Oculomotor Intact.   Visual Perception Comments Initially, reports blurriness and increased stratn.    Reports doing pencil push ups as a kid, R eye phoria with convergence testing.  Denies any strain with reading currently.   Activity Tolerance   Activity Tolerance Reports fatigue with getting dressed, does not nap.   Instrumental Activities of Daily Living Assessment   IADL Assessment/Observations Patient independent with all ADLs/IADLs.  Patient often forgets to take her medications.  Patient I with finances/bills and has not forgotten to pay any bills.  Cooking: normally does not cook, normally goes out or purchases small meals.   Planned Therapy Interventions   Planned Therapy Interventions IADL training;Community/work reintegration;Self care/Home management;Therapeutic activities;Visual perception;Cognitive skills   Adult OT Eval Goals   OT Eval Goals (Adult) 1;2;3;4    OT Goal 1   Goal Identifier 1-Managing Symptoms   Goal Description Patient will identify 3 strategies (self-awareness and self-management of symptoms, energy conservation strategies, etc.) to decrease her post-concussion symptoms (fatigue, sleep disturbance, headaches, etc.), for increased independence during ADL/IADLs.   Target Date 11/09/22    OT Goal 2   Goal Identifier 2-Attention   Goal Description Pt will demonstrate 90% accuracy on complex calendar management task with use of 1-2 strategies to improve attention and organization with mod I.   Target Date 11/09/22    OT Goal 3   Goal Identifier 3-Memory   Goal Description Patient will demonstrate improved memory skills by completing short-term memory tasks in occupational therapy with 85-90% accuracy using compensatory strategies for increased safety and independence with ADL/IADLS (remembering to take medications, appts, etc.).   Target Date 11/09/22   Clinical  Impression   Criteria for Skilled Therapeutic Interventions Met Yes, treatment indicated   OT Diagnosis decreased IADL independence   Influenced by the following impairments concussion symptoms   Assessment of Occupational Performance 1-3 Performance Deficits   Identified Performance Deficits work, IADLs   Clinical Decision Making (Complexity) Low complexity   Therapy Frequency 1x/week   Predicted Duration of Therapy Intervention (days/wks) 4 weeks   Risks and Benefits of Treatment have been explained. Yes   Patient, Family & other staff in agreement with plan of care Yes   Education Assessment   Barriers To Learning No Barriers   Preferred Learning Style Listening;Reading;Demonstration;Pictures/video   Therapy Certification   Certification date from 08/11/22   Certification date to 11/09/22   Total Evaluation Time   OT Eval, Low Complexity Minutes (90278) 33

## 2022-08-16 NOTE — PROGRESS NOTES
[facility]          OUTPATIENT OCCUPATIONAL THERAPY  EVALUATION  PLAN OF TREATMENT FOR OUTPATIENT REHABILITATION  (COMPLETE FOR INITIAL CLAIMS ONLY)  Patient's Last Name, First Name, M.I.  YOB: 1970  RamilaMaribel [place]                        Provider's Name  [facility] Medical Record No.  [id number]                               Onset Date:     07/31/22 (concussion in March)   Start of Care Date:     08/11/22   Type:     ___PT   _X_OT   ___SLP Medical Diagnosis:     Post Concussion syndrom                          OT Diagnosis:     decreased IADL independence Visits from SOC:  1   _________________________________________________________________________________  Plan of Treatment/Functional Goals:  IADL training, Community/work reintegration, Self care/Home management, Therapeutic activities, Visual perception, Cognitive skills                    Goals  Goal Identifier: 1-Managing Symptoms  Goal Description: Patient will identify 3 strategies (self-awareness and self-management of symptoms, energy conservation strategies, etc.) to decrease her post-concussion symptoms (fatigue, sleep disturbance, headaches, etc.), for increased independence during ADL/IADLs.  Target Date: 11/09/22     Goal Identifier: 2-Attention  Goal Description: Pt will demonstrate 90% accuracy on complex calendar management task with use of 1-2 strategies to improve attention and organization with mod I.  Target Date: 11/09/22     Goal Identifier: 3-Memory  Goal Description: Patient will demonstrate improved memory skills by completing short-term memory tasks in occupational therapy with 85-90% accuracy using compensatory strategies for increased safety and independence with ADL/IADLS (remembering to take medications, appts, etc.).  Target Date: 11/09/22         Therapy Frequency: 1x/week      Predicted Duration of Therapy Intervention (days/wks): 4 weeks  Gabrielle Dickey OTR/L         I CERTIFY THE NEED FOR THESE SERVICES FURNISHED UNDER        THIS PLAN OF TREATMENT AND WHILE UNDER MY CARE     (Physician co-signature of this document indicates review and certification of the therapy plan).                 ,    Certification date from: 08/11/22, Certification date to: 11/09/22               Referring Physician: Aydee Delgadillo APRN CNP     Initial Assessment        See Epic Evaluation      Start Of Care Date: 08/11/22

## 2022-08-26 ENCOUNTER — MYC REFILL (OUTPATIENT)
Dept: NEUROLOGY | Facility: CLINIC | Age: 52
End: 2022-08-26

## 2022-08-26 DIAGNOSIS — F98.8 ADD (ATTENTION DEFICIT DISORDER) WITHOUT HYPERACTIVITY: ICD-10-CM

## 2022-08-26 DIAGNOSIS — F07.81 POST CONCUSSION SYNDROME: ICD-10-CM

## 2022-08-26 DIAGNOSIS — R41.840 ATTENTION AND CONCENTRATION DEFICIT: ICD-10-CM

## 2022-08-27 RX ORDER — LISDEXAMFETAMINE DIMESYLATE 30 MG/1
30 CAPSULE ORAL EVERY MORNING
Qty: 30 CAPSULE | Refills: 0 | Status: SHIPPED | OUTPATIENT
Start: 2022-08-27 | End: 2022-09-15

## 2022-09-13 ENCOUNTER — HOSPITAL ENCOUNTER (OUTPATIENT)
Dept: PHYSICAL THERAPY | Facility: CLINIC | Age: 52
Discharge: HOME | End: 2022-09-13
Payer: COMMERCIAL

## 2022-09-13 DIAGNOSIS — M54.2 NECK PAIN: Primary | ICD-10-CM

## 2022-09-13 PROCEDURE — 97110 THERAPEUTIC EXERCISES: CPT | Mod: GP | Performed by: PHYSICAL THERAPIST

## 2022-09-15 ENCOUNTER — VIRTUAL VISIT (OUTPATIENT)
Dept: NEUROLOGY | Facility: CLINIC | Age: 52
End: 2022-09-15
Payer: COMMERCIAL

## 2022-09-15 ENCOUNTER — HOSPITAL ENCOUNTER (OUTPATIENT)
Dept: OCCUPATIONAL THERAPY | Facility: CLINIC | Age: 52
Discharge: HOME | End: 2022-09-15
Payer: COMMERCIAL

## 2022-09-15 DIAGNOSIS — R41.840 ATTENTION AND CONCENTRATION DEFICIT: ICD-10-CM

## 2022-09-15 DIAGNOSIS — F98.8 ADD (ATTENTION DEFICIT DISORDER) WITHOUT HYPERACTIVITY: ICD-10-CM

## 2022-09-15 DIAGNOSIS — F07.81 POST CONCUSSION SYNDROME: Primary | ICD-10-CM

## 2022-09-15 DIAGNOSIS — G47.00 INSOMNIA, UNSPECIFIED TYPE: ICD-10-CM

## 2022-09-15 DIAGNOSIS — Z78.9 IMPAIRED INSTRUMENTAL ACTIVITIES OF DAILY LIVING (IADL): ICD-10-CM

## 2022-09-15 PROCEDURE — 97535 SELF CARE MNGMENT TRAINING: CPT | Mod: GO | Performed by: OCCUPATIONAL THERAPIST

## 2022-09-15 PROCEDURE — 99215 OFFICE O/P EST HI 40 MIN: CPT | Mod: 95 | Performed by: NURSE PRACTITIONER

## 2022-09-15 RX ORDER — METHYLPHENIDATE HYDROCHLORIDE 5 MG/1
5 TABLET ORAL 2 TIMES DAILY
Qty: 60 TABLET | Refills: 0 | Status: SHIPPED | OUTPATIENT
Start: 2022-09-15 | End: 2022-12-16

## 2022-09-15 RX ORDER — LISDEXAMFETAMINE DIMESYLATE 40 MG/1
40 CAPSULE ORAL EVERY MORNING
Qty: 30 CAPSULE | Refills: 0 | Status: SHIPPED | OUTPATIENT
Start: 2022-09-15 | End: 2022-12-16

## 2022-09-15 NOTE — LETTER
"    9/15/2022         RE: Maribel Mcgrath  [address] [facility] [address]        Dear Colleague,    Thank you for referring your patient, Maribel Mcgrath, to the [facility]. Please see a copy of my visit note below.    Video Visit: Concussion Follow up:   Maribel Mcgrath is a 52 year old female who is being evaluated via a billable video visit       The patient has been notified of the following:     \"This video visit will be conducted via a video call between you and your provider. We have found that certain health care needs can be provided without the need for a physical exam.  This service lets us provide the care you need with a short video conversation.  If a prescription is necessary we can send it directly to your pharmacy.  If lab work is needed we can place an order for that and you can then stop by our lab to have the test done at a later time.    If during the course of the call the provider feels a video visit is not appropriate, you will not be charged for this service.\"     Patient has given verbal consent to a video visit? Yes    Visit Check In:   Orders from previous visit:  Order Wellbutrin and Amitriptyline, send message for PT to be rescheduled. . \Neuropsychological assessment completed    No   Currently doing PT  Yes    Completed No   Currently doing OT  No    Completed N/A   ST    Completed No   Psychology  No   Return to Work/School   Full scheduled hours  Yes     Currently on medication to help with sleep    Yes      Currently on any mental health medications     No      Currently on medication for attention, ADD/ADHD    Yes    Vyvance                                                      Workman's Comp   No     Outpatient Follow up Mild TBI (Concussion)  Evaluation:   Maribel Mcgrath chief complaint is Post Concussion Syndrome     Is patient on a controlled substance prescribed by me?  Yes    checked    Yes   Follow up appointment  " "Message sent to schedulers for follow-up:    HPI:      Pertinent History:   Per Pt EMR: Maribel Mcgrath is a right-handed 51 year old female who presents with a head injury after a fall. Per the patient, nine days ago she spun around while brushing her teeth in her bathroom and the rug slipped out from under her, causing her to fall and hit her head. She hit her jaw and the back of her head. She denies loss of consciousness. Since then, she reports ongoing phonophobia, headache, dizziness, nausea, and appetite loss. She also notes feeling \"clumsy\", without   focal numbness or weakness. She denies trismus, drainage from her ear or mouth. She denies alcohol use since the fall. She also denies numbness, weakness, visual disturbance. She has not taken any medications for her symptoms. She is traveling to [place] in three days and wanted to be evaluated prior to this.    Date of accident :  3/19/22    Plan:        We discussed some treatment options and have elected to   Increase Vyvance, start Ritalin and Amitriptyline, we will also do labs at next appointment    Medication Adjustment:  Ritalin 5 mg, take one tab BID  Vyvance 40 mg, take one tab every am  Amitriptyline 25 mg, take 1-2 tabs every HS    Return to Work/School   Full scheduled hours  Yes    Note completed    No      Return to clinic 8 weeks    Continue with the support of the clinic, reassurance, and redirection. Staff monitoring and ongoing assessments per team plan. This team will utilize appropriate emergency services if necessary. I will make myself available if concerns or problems arise.  The patient agrees to call/message before her next visit with any questions, concerns or problems.    Progress Note:        The patient returns to the concussion clinic for a follow up visit, She was last seen by me on 7/19/22, I was going to start the patient on Vyvance and Ritalin.There was problems with the patient getting her medication so the only thing she " started was Vyvance. She does not think the Vyvance is working too well. The patient does report that she did have Covid in July. She does feel that her cognition has worsen, she is having a problem remembering things. Overall patient is reporting worsening or no change in her physical, emotional and cognitive symptoms.       Subjective:        Overall improvement from last visit   No     Headaches:  Significant ongoing headaches Yes   Headaches: Intermittently  Improvement :No   Current Headache No   Wake with HA  Yes     Physical Symptoms:  Headache-Yes     Since last visit  Same     Nausea-No         Balance problems - Yes    Since last visit  Same      Dizziness - Yes          Since last visit  Same     Visual problems - Yes    Since last visit  Same     Fatigue - Yes              Since last visit  Worsen     Sensitivity to light - Yes        Since last visit  Same     Sensitivity to sound - Yes       Since last visit  Same     Numbness/tingling - No         Cognitive Symptoms  Feeling mentally foggy -No        Since last visit  Same     Feeling slowed down -Yes        Since last visit  Same     Difficulty Concentrating- Yes      Since last visit  Worsen     Difficulty remembering - Yes        Since last visit  Worsen       Emotional Symptoms  Irritability - Yes        Since last visit  Same     Sadness-  Yes      Since last visit  Same     More emotional - Yes       Since last visit  Same     Nervousness/anxiety -Yes       Since last visit  Same       Sleep History:  Sleep less than usual - Yes    Sleep more than usual - No    Trouble falling asleep - No      Trouble staying asleep - Yes       Since last visit  Same     Wake feeling rested - No         Since last visit  Same        Exertion:         Do the above stated symptoms worsen with physical activity? Yes        Since last visit  Same           Do the above stated symptoms worsen with cognitive activity? Yes       Since last visit  Worsen             Objective:     Patient Active Problem List    Diagnosis Date Noted     ADD (attention deficit disorder) without hyperactivity 08/31/2021     Priority: Medium     Mixed hyperlipidemia 08/31/2021     Priority: Medium     Hyperopia, bilateral 08/12/2021     Priority: Medium     Presbyopia 08/12/2021     Priority: Medium     Depression, major, single episode, moderate (H) 10/16/2019     Priority: Medium     Past Medical History:   Diagnosis Date     Anxiety      Herpes genitalis      Hyperlipidemia      Major depression     Has seen neuro due to light headedness, nausea, palpitations. Tried zoloft, paxil (did not like paxil).      Menopause present 2017    FSH very elevated  157     Past Surgical History:   Procedure Laterality Date     ADENOIDECTOMY       COSMETIC MAMMOPLASTY AUGMENTATION BILATERAL  2011     Family History   Problem Relation Age of Onset     Diabetes Maternal Grandmother      Hyperlipidemia Mother      Heart Disease Mother      Hyperlipidemia Father      Heart Disease Father      Current Outpatient Medications   Medication Sig Dispense Refill     amitriptyline (ELAVIL) 25 MG tablet Take 1-2 tablets (25-50 mg) by mouth At Bedtime 60 tablet 3     lisdexamfetamine (VYVANSE) 40 MG capsule Take 1 capsule (40 mg) by mouth every morning 30 capsule 0     methylphenidate (RITALIN) 5 MG tablet Take 1 tablet (5 mg) by mouth 2 times daily 60 tablet 0     Social History     Socioeconomic History     Marital status: Single     Spouse name: Not on file     Number of children: Not on file     Years of education: Not on file     Highest education level: Not on file   Occupational History     Employer: [employer]   Tobacco Use     Smoking status: Never Smoker     Smokeless tobacco: Never Used   Substance and Sexual Activity     Alcohol use: Yes     Alcohol/week: 0.0 standard drinks     Drug use: No     Sexual activity: Not Currently     Birth control/protection: Post-menopausal   Other Topics Concern      Parent/sibling w/ CABG, MI or angioplasty before 65F 55M? Not Asked   Social History Narrative     Not on file     Social Determinants of Health     Financial Resource Strain: Not on file   Food Insecurity: Not on file   Transportation Needs: Not on file   Physical Activity: Not on file   Stress: Not on file   Social Connections: Not on file   Intimate Partner Violence: Not on file   Housing Stability: Not on file       ALLERGIES  Atorvastatin    The following portions of the patient's history were reviewed and updated as appropriate: allergies, current medications, past family history, past medical history, past social history, past surgical history and problem list.    Review of Systems  A comprehensive review of systems was negative except for: What is noted above    Mental Status Examination  Alertness:  alert  and oriented  Appearance:  well groomed  Behavior/Demeanor:  cooperative, pleasant and calm, with good  eye contact.  Speech:  normal  Psychomotor:  normal or unremarkable    Mood:  good  Affect:  appropriate and was congruent to speech content.  Thought Process/Associations: unremarkable   Thought Content: devoid of  suicidal and violent ideation and delusions.   Perception: devoid of  hallucinations  Insight:  good.  Judgment: good.  Attention/Concentration:  Normal  Language:  Intact  Fund of Knowledge:  Average.    Memory:  Immediate recall intact, Short-term memory intact and Long-term memory intact.       Counseling:   Discussion was held with the patient today regarding concussion in general including types of injury, symptoms that are common, treatment and variability in time to recover  I have reassured the patient her symptoms are very common when a concussion is present and will improve with time. We discussed the risks and benefits of possible medication used to help concussive symptoms including risk of worsening depression with medication adjustments and even the possibility of emergence of  suicidal ideations. We will assess for the appropriateness of possible psychotropic medication trials/changes. The patient will seek out appropriate emergency services should that become necessary. The patient agrees to call/message before her next visit with any questions, concerns or problems.    Visit Details:     Type of service: Video Visit    Video Start Time: 0835    Video End Time:  0907    Originating Location: Patient's home    Distant Location:  [facility]    Mode of Communication: Video Conference via  [employer]     Diagnosis managed and treated at today's visit :  Post concussion syndrome  Post concussion headache  Nausea  Dizziness  Fatigue  Insomnia  Sensitivity to light  Sound sensitivity  Concentration and Attention deficit  Memory difficulties  Anxiety d/t a medical condition  Irritability  Return to work    Total time today (40 min) in this patient encounter was spent on pre-charting, chart review, review of outside records, review of test results, interpretation of tests, patient visit and documentation and counseling and/or coordination of care. The patient is in agreement with this plan and has no further questions.    General Information:   Today you had your appointment with Aydee Delgadillo CNP     If lab work was done today as part of your evaluation you will generally be contacted via My Chart, mail, or phone with the results within 1-5 days. If there is an alarming result we will contact you by phone. Lab results come back at varying times, I generally wait until all labs are resulted before making comments on results. Please note labs are automatically released to My Chart once available.     If you need refills please contact your pharmacist. They will send a refill request to me to review. If it is a controlled substance please message me through "[employer]. Please allow 3 business days for us to process all refill requests.     Please call or send  a medical message through My Chart, with any questions or concerns    If you need any paperwork completed please fax forms to [phone]. Please state if you would like a copy of the completed paperwork, mailed or faxed back to the patient and a fax number to fax the paperwork to. Please allow up to 10 business days for paperwork to be completed.    ANISH German, CNP      [facility]  [facility] [address]  [address] [place] [id number]  Office: [phone]  Fax: [phone]          Again, thank you for allowing me to participate in the care of your patient.        Sincerely,        ANISH German CNP

## 2022-09-15 NOTE — PROGRESS NOTES
"Video Visit: Concussion Follow up:   Maribel Mcgrath is a 52 year old female who is being evaluated via a billable video visit       The patient has been notified of the following:     \"This video visit will be conducted via a video call between you and your provider. We have found that certain health care needs can be provided without the need for a physical exam.  This service lets us provide the care you need with a short video conversation.  If a prescription is necessary we can send it directly to your pharmacy.  If lab work is needed we can place an order for that and you can then stop by our lab to have the test done at a later time.    If during the course of the call the provider feels a video visit is not appropriate, you will not be charged for this service.\"     Patient has given verbal consent to a video visit? Yes    Visit Check In:   Orders from previous visit:  Order Wellbutrin and Amitriptyline, send message for PT to be rescheduled. . \Neuropsychological assessment completed    No   Currently doing PT  Yes    Completed No   Currently doing OT  No    Completed N/A   ST    Completed No   Psychology  No   Return to Work/School   Full scheduled hours  Yes     Currently on medication to help with sleep    Yes      Currently on any mental health medications     No      Currently on medication for attention, ADD/ADHD    Yes    Vyvance                                                      Workman's Comp   No     Outpatient Follow up Mild TBI (Concussion)  Evaluation:   Maribel Mcgrath chief complaint is Post Concussion Syndrome     Is patient on a controlled substance prescribed by me?  Yes    checked    Yes   Follow up appointment  Message sent to schedulers for follow-up:    HPI:      Pertinent History:   Per Pt EMR: Maribel Mcgrath is a right-handed 51 year old female who presents with a head injury after a fall. Per the patient, nine days ago she spun around while brushing her teeth in " "her bathroom and the rug slipped out from under her, causing her to fall and hit her head. She hit her jaw and the back of her head. She denies loss of consciousness. Since then, she reports ongoing phonophobia, headache, dizziness, nausea, and appetite loss. She also notes feeling \"clumsy\", without   focal numbness or weakness. She denies trismus, drainage from her ear or mouth. She denies alcohol use since the fall. She also denies numbness, weakness, visual disturbance. She has not taken any medications for her symptoms. She is traveling to [place] in three days and wanted to be evaluated prior to this.    Date of accident :  3/19/22    Plan:        We discussed some treatment options and have elected to   Increase Vyvance, start Ritalin and Amitriptyline, we will also do labs at next appointment    Medication Adjustment:  Ritalin 5 mg, take one tab BID  Vyvance 40 mg, take one tab every am  Amitriptyline 25 mg, take 1-2 tabs every HS    Return to Work/School   Full scheduled hours  Yes    Note completed    No      Return to clinic 8 weeks    Continue with the support of the clinic, reassurance, and redirection. Staff monitoring and ongoing assessments per team plan. This team will utilize appropriate emergency services if necessary. I will make myself available if concerns or problems arise.  The patient agrees to call/message before her next visit with any questions, concerns or problems.    Progress Note:        The patient returns to the concussion clinic for a follow up visit, She was last seen by me on 7/19/22, I was going to start the patient on Vyvance and Ritalin.There was problems with the patient getting her medication so the only thing she started was Vyvance. She does not think the Vyvance is working too well. The patient does report that she did have Covid in July. She does feel that her cognition has worsen, she is having a problem remembering things. Overall patient is reporting worsening or no " change in her physical, emotional and cognitive symptoms.       Subjective:        Overall improvement from last visit   No     Headaches:  Significant ongoing headaches Yes   Headaches: Intermittently  Improvement :No   Current Headache No   Wake with HA  Yes     Physical Symptoms:  Headache-Yes     Since last visit  Same     Nausea-No         Balance problems - Yes    Since last visit  Same      Dizziness - Yes          Since last visit  Same     Visual problems - Yes    Since last visit  Same     Fatigue - Yes              Since last visit  Worsen     Sensitivity to light - Yes        Since last visit  Same     Sensitivity to sound - Yes       Since last visit  Same     Numbness/tingling - No         Cognitive Symptoms  Feeling mentally foggy -No        Since last visit  Same     Feeling slowed down -Yes        Since last visit  Same     Difficulty Concentrating- Yes      Since last visit  Worsen     Difficulty remembering - Yes        Since last visit  Worsen       Emotional Symptoms  Irritability - Yes        Since last visit  Same     Sadness-  Yes      Since last visit  Same     More emotional - Yes       Since last visit  Same     Nervousness/anxiety -Yes       Since last visit  Same       Sleep History:  Sleep less than usual - Yes    Sleep more than usual - No    Trouble falling asleep - No      Trouble staying asleep - Yes       Since last visit  Same     Wake feeling rested - No         Since last visit  Same        Exertion:         Do the above stated symptoms worsen with physical activity? Yes        Since last visit  Same           Do the above stated symptoms worsen with cognitive activity? Yes       Since last visit  Worsen            Objective:     Patient Active Problem List    Diagnosis Date Noted     ADD (attention deficit disorder) without hyperactivity 08/31/2021     Priority: Medium     Mixed hyperlipidemia 08/31/2021     Priority: Medium     Hyperopia, bilateral 08/12/2021     Priority:  Medium     Presbyopia 08/12/2021     Priority: Medium     Depression, major, single episode, moderate (H) 10/16/2019     Priority: Medium     Past Medical History:   Diagnosis Date     Anxiety      Herpes genitalis      Hyperlipidemia      Major depression     Has seen neuro due to light headedness, nausea, palpitations. Tried zoloft, paxil (did not like paxil).      Menopause present 2017    FSH very elevated  157     Past Surgical History:   Procedure Laterality Date     ADENOIDECTOMY       COSMETIC MAMMOPLASTY AUGMENTATION BILATERAL  2011     Family History   Problem Relation Age of Onset     Diabetes Maternal Grandmother      Hyperlipidemia Mother      Heart Disease Mother      Hyperlipidemia Father      Heart Disease Father      Current Outpatient Medications   Medication Sig Dispense Refill     amitriptyline (ELAVIL) 25 MG tablet Take 1-2 tablets (25-50 mg) by mouth At Bedtime 60 tablet 3     lisdexamfetamine (VYVANSE) 40 MG capsule Take 1 capsule (40 mg) by mouth every morning 30 capsule 0     methylphenidate (RITALIN) 5 MG tablet Take 1 tablet (5 mg) by mouth 2 times daily 60 tablet 0     Social History     Socioeconomic History     Marital status: Single     Spouse name: Not on file     Number of children: Not on file     Years of education: Not on file     Highest education level: Not on file   Occupational History     Employer: [employer]   Tobacco Use     Smoking status: Never Smoker     Smokeless tobacco: Never Used   Substance and Sexual Activity     Alcohol use: Yes     Alcohol/week: 0.0 standard drinks     Drug use: No     Sexual activity: Not Currently     Birth control/protection: Post-menopausal   Other Topics Concern     Parent/sibling w/ CABG, MI or angioplasty before 65F 55M? Not Asked   Social History Narrative     Not on file     Social Determinants of Health     Financial Resource Strain: Not on file   Food Insecurity: Not on file   Transportation Needs: Not on file   Physical Activity: Not  on file   Stress: Not on file   Social Connections: Not on file   Intimate Partner Violence: Not on file   Housing Stability: Not on file       ALLERGIES  Atorvastatin    The following portions of the patient's history were reviewed and updated as appropriate: allergies, current medications, past family history, past medical history, past social history, past surgical history and problem list.    Review of Systems  A comprehensive review of systems was negative except for: What is noted above    Mental Status Examination  Alertness:  alert  and oriented  Appearance:  well groomed  Behavior/Demeanor:  cooperative, pleasant and calm, with good  eye contact.  Speech:  normal  Psychomotor:  normal or unremarkable    Mood:  good  Affect:  appropriate and was congruent to speech content.  Thought Process/Associations: unremarkable   Thought Content: devoid of  suicidal and violent ideation and delusions.   Perception: devoid of  hallucinations  Insight:  good.  Judgment: good.  Attention/Concentration:  Normal  Language:  Intact  Fund of Knowledge:  Average.    Memory:  Immediate recall intact, Short-term memory intact and Long-term memory intact.       Counseling:   Discussion was held with the patient today regarding concussion in general including types of injury, symptoms that are common, treatment and variability in time to recover  I have reassured the patient her symptoms are very common when a concussion is present and will improve with time. We discussed the risks and benefits of possible medication used to help concussive symptoms including risk of worsening depression with medication adjustments and even the possibility of emergence of suicidal ideations. We will assess for the appropriateness of possible psychotropic medication trials/changes. The patient will seek out appropriate emergency services should that become necessary. The patient agrees to call/message before her next visit with any questions,  concerns or problems.    Visit Details:     Type of service: Video Visit    Video Start Time: 0835    Video End Time:  0907    Originating Location: Patient's home    Distant Location:  [facility]  [place]    Mode of Communication: Video Conference via  [email or web address] Medical     Diagnosis managed and treated at today's visit :  Post concussion syndrome  Post concussion headache  Nausea  Dizziness  Fatigue  Insomnia  Sensitivity to light  Sound sensitivity  Concentration and Attention deficit  Memory difficulties  Anxiety d/t a medical condition  Irritability  Return to work    Total time today (40 min) in this patient encounter was spent on pre-charting, chart review, review of outside records, review of test results, interpretation of tests, patient visit and documentation and counseling and/or coordination of care. The patient is in agreement with this plan and has no further questions.    General Information:   Today you had your appointment with Aydee Delgadillo CNP     If lab work was done today as part of your evaluation you will generally be contacted via My Chart, mail, or phone with the results within 1-5 days. If there is an alarming result we will contact you by phone. Lab results come back at varying times, I generally wait until all labs are resulted before making comments on results. Please note labs are automatically released to My Chart once available.     If you need refills please contact your pharmacist. They will send a refill request to me to review. If it is a controlled substance please message me through [employer]. Please allow 3 business days for us to process all refill requests.     Please call or send a medical message through My Chart, with any questions or concerns    If you need any paperwork completed please fax forms to [phone]. Please state if you would like a copy of the completed paperwork, mailed or faxed back to the patient and a fax number to fax the  paperwork to. Please allow up to 10 business days for paperwork to be completed.    ANISH German, CNP      [facility]  [facility] [address]  [address] [place] [id number]  Office: [phone]  Fax: [phone]

## 2022-09-16 ENCOUNTER — TELEPHONE (OUTPATIENT)
Dept: NEUROLOGY | Facility: CLINIC | Age: 52
End: 2022-09-16

## 2022-10-23 ENCOUNTER — HEALTH MAINTENANCE LETTER (OUTPATIENT)
Age: 52
End: 2022-10-23

## 2022-12-10 ENCOUNTER — HEALTH MAINTENANCE LETTER (OUTPATIENT)
Age: 52
End: 2022-12-10

## 2022-12-15 ENCOUNTER — VIRTUAL VISIT (OUTPATIENT)
Dept: NEUROLOGY | Facility: CLINIC | Age: 52
End: 2022-12-15
Payer: COMMERCIAL

## 2022-12-15 DIAGNOSIS — G47.00 INSOMNIA, UNSPECIFIED TYPE: ICD-10-CM

## 2022-12-15 DIAGNOSIS — R41.840 ATTENTION AND CONCENTRATION DEFICIT: ICD-10-CM

## 2022-12-15 DIAGNOSIS — F07.81 POST CONCUSSION SYNDROME: Primary | ICD-10-CM

## 2022-12-15 DIAGNOSIS — F98.8 ADD (ATTENTION DEFICIT DISORDER) WITHOUT HYPERACTIVITY: ICD-10-CM

## 2022-12-15 PROCEDURE — 99215 OFFICE O/P EST HI 40 MIN: CPT | Mod: 95 | Performed by: NURSE PRACTITIONER

## 2022-12-15 ASSESSMENT — PATIENT HEALTH QUESTIONNAIRE - PHQ9
SUM OF ALL RESPONSES TO PHQ QUESTIONS 1-9: 15
SUM OF ALL RESPONSES TO PHQ QUESTIONS 1-9: 15
10. IF YOU CHECKED OFF ANY PROBLEMS, HOW DIFFICULT HAVE THESE PROBLEMS MADE IT FOR YOU TO DO YOUR WORK, TAKE CARE OF THINGS AT HOME, OR GET ALONG WITH OTHER PEOPLE: VERY DIFFICULT

## 2022-12-15 NOTE — NURSING NOTE
CONCUSSION SYMPTOMS ASSESSMENT 8/11/2022 9/13/2022 12/15/2022   Headache or Pressure In Head 3 - moderate 0 - none 1 - mild   Upset Stomach or Throwing Up 0 - none 0 - none 0 - none   Problems with Balance 2 - mild to moderate 2 - mild to moderate 0 - none   Feeling Dizzy 2 - mild to moderate 0 - none 0 - none   Sensitivity to Light 0 - none 0 - none 0 - none   Sensitivity to Noise 4 - moderate to severe 3 - moderate 1 - mild   Mood Changes 5 - severe 0 - none 0 - none   Feeling sluggish, hazy, or foggy 5 - severe 3 - moderate 3 - moderate   Trouble Concentrating, Lack of Focus 5 - severe 3 - moderate 3 - moderate   Motion Sickness 0 - none 0 - none 0 - none   Vision Changes 0 - none 0 - none 0 - none   Memory Problems 5 - severe 3 - moderate 3 - moderate   Feeling Confused 4 - moderate to severe 2 - mild to moderate 2 - mild to moderate   Neck Pain 4 - moderate to severe 3 - moderate 0 - none   Trouble Sleeping 5 - severe 3 - moderate 3 - moderate   Total Number of Symptoms 11 8 7   Symptom Severity Score 44 22 16

## 2022-12-15 NOTE — PROGRESS NOTES
"Video Visit: Concussion Follow up:   Maribel Mcgrath is a 52 year old female who is being evaluated via a billable video visit       The patient has been notified of the following:     \"This video visit will be conducted via a video call between you and your provider. We have found that certain health care needs can be provided without the need for a physical exam.  This service lets us provide the care you need with a short video conversation.  If a prescription is necessary we can send it directly to your pharmacy.  If lab work is needed we can place an order for that and you can then stop by our lab to have the test done at a later time.    If during the course of the call the provider feels a video visit is not appropriate, you will not be charged for this service.\"     Patient has given verbal consent to a video visit? Yes    Visit Check In:   Orders from previous visit: Increase Vyvance, start Ritalin and Amitriptyline, we will also do labs at next appointment    Neuropsychological assessment completed    No   Currently doing PT  No    Completed Yes   Currently doing OT  No    Completed Yes   ST    Completed No   Psychology  No   Return to Work/School   Full scheduled hours  Yes     Currently on medication to help with sleep    Yes      Currently on any mental health medications     No      Currently on medication for attention, ADD/ADHD    Yes    Vyvance                                                      Workman's Comp   No     Outpatient Follow up Mild TBI (Concussion)  Evaluation:   Mraibel Mcgrath chief complaint is Post Concussion Syndrome     Is patient on a controlled substance prescribed by me?  Yes    checked    Yes   Follow up appointment  Message sent to  for follow-up:    HPI:      Pertinent History:   Per Pt EMR: Maribel Mcgrath is a right-handed 51 year old female who presents with a head injury after a fall. Per the patient, nine days ago she spun around while brushing her " "teeth in her bathroom and the rug slipped out from under her, causing her to fall and hit her head. She hit her jaw and the back of her head. She denies loss of consciousness. Since then, she reports ongoing phonophobia, headache, dizziness, nausea, and appetite loss. She also notes feeling \"clumsy\", without   focal numbness or weakness. She denies trismus, drainage from her ear or mouth. She denies alcohol use since the fall. She also denies numbness, weakness, visual disturbance. She has not taken any medications for her symptoms. She is traveling to [place] in three days and wanted to be evaluated prior to this.    Date of accident :  3/19/22    Plan:        We discussed some treatment options and have elected to   reorder Vyvanse, Ritalin, and Amitriptyline    Medication Adjustment:  Ritalin 5 mg, take one tab BID  Vyvance 40 mg, take one tab every am  Amitriptyline 25 mg, take 1-2 tabs every HS    Return to Work/School   Full scheduled hours  Yes    Note completed    No      Return to clinic 10 weeks    Continue with the support of the clinic, reassurance, and redirection. Staff monitoring and ongoing assessments per team plan. This team will utilize appropriate emergency services if necessary. I will make myself available if concerns or problems arise.  The patient agrees to call/message before her next visit with any questions, concerns or problems.    Progress Note:        The patient returns to the concussion clinic for a follow up visit, She was last seen by me on 9/15/22, I increased Vyvanse, and started Ritalin and Amitriptyline. The patient reports that she didn't start the Ritalin and Amitriptyline because she wanted to discuss side effects first. She reports that she is doing good with the Vyvanse but still needs to \"write everything down\". She has been working a lot. She reports that symptoms do worsen throughout the day.  Overall the patient is reporting no change in headaches, trouble staying asleep " and waking feeling rested. She reports improvement in all other physical, emotional and cognitive symptoms.     Subjective:        Overall improvement from last visit   Yes    Headaches:  Significant ongoing headaches Yes   Headaches: Intermittently  Improvement :No   Current Headache No   Wake with HA  Yes     Physical Symptoms:  Headache-Yes     Since last visit  Same Nausea-No         Balance problems - No  Dizziness - No  Visual problems - No  Fatigue - Yes              Since last visit  Improved    Sensitivity to light - No  Sensitivity to sound - Yes       Since last visit  Improved     Numbness/tingling - No         Cognitive Symptoms  Feeling mentally foggy -No        Since last visit  Improved    Feeling slowed down -Yes        Since last visit   Improved    Difficulty Concentrating- Yes      Since last visit   Improved    Difficulty remembering - Yes        Since last visit   Improved       Emotional Symptoms  Irritability - No  Sadness-  No  More emotional - No  Nervousness/anxiety -No    Sleep History:  Sleep less than usual - Yes    Sleep more than usual - No    Trouble falling asleep - No      Trouble staying asleep - Yes       Since last visit  Same     Wake feeling rested - No         Since last visit  Same        Exertion:         Do the above stated symptoms worsen with physical activity? Yes        Since last visit  Improved          Do the above stated symptoms worsen with cognitive activity? Yes       Since last visit  Improved            Objective:     Patient Active Problem List    Diagnosis Date Noted     ADD (attention deficit disorder) without hyperactivity 08/31/2021     Priority: Medium     Mixed hyperlipidemia 08/31/2021     Priority: Medium     Hyperopia, bilateral 08/12/2021     Priority: Medium     Presbyopia 08/12/2021     Priority: Medium     Depression, major, single episode, moderate (H) 10/16/2019     Priority: Medium     Past Medical History:   Diagnosis Date     Anxiety       Herpes genitalis      Hyperlipidemia      Major depression     Has seen neuro due to light headedness, nausea, palpitations. Tried zoloft, paxil (did not like paxil).      Menopause present 2017    FSH very elevated  157     Past Surgical History:   Procedure Laterality Date     ADENOIDECTOMY       COSMETIC MAMMOPLASTY AUGMENTATION BILATERAL  2011     Family History   Problem Relation Age of Onset     Diabetes Maternal Grandmother      Hyperlipidemia Mother      Heart Disease Mother      Hyperlipidemia Father      Heart Disease Father      Current Outpatient Medications   Medication Sig Dispense Refill     lisdexamfetamine (VYVANSE) 40 MG capsule Take 1 capsule (40 mg) by mouth every morning 30 capsule 0     amitriptyline (ELAVIL) 25 MG tablet Take 1-2 tablets (25-50 mg) by mouth At Bedtime (Patient not taking: Reported on 12/15/2022) 60 tablet 3     methylphenidate (RITALIN) 5 MG tablet Take 1 tablet (5 mg) by mouth 2 times daily (Patient not taking: Reported on 12/15/2022) 60 tablet 0     Social History     Socioeconomic History     Marital status: Single     Spouse name: Not on file     Number of children: Not on file     Years of education: Not on file     Highest education level: Not on file   Occupational History     Employer: [employer]   Tobacco Use     Smoking status: Never     Smokeless tobacco: Never   Substance and Sexual Activity     Alcohol use: Yes     Alcohol/week: 0.0 standard drinks     Drug use: No     Sexual activity: Not Currently     Birth control/protection: Post-menopausal   Other Topics Concern     Parent/sibling w/ CABG, MI or angioplasty before 65F 55M? Not Asked   Social History Narrative     Not on file     Social Determinants of Health     Financial Resource Strain: Not on file   Food Insecurity: Not on file   Transportation Needs: Not on file   Physical Activity: Not on file   Stress: Not on file   Social Connections: Not on file   Intimate Partner Violence: Not on file   Housing  Stability: Not on file       ALLERGIES  Atorvastatin    The following portions of the patient's history were reviewed and updated as appropriate: allergies, current medications, past family history, past medical history, past social history, past surgical history and problem list.    Review of Systems  A comprehensive review of systems was negative except for: What is noted above    Mental Status Examination  Alertness:  alert  and oriented  Appearance:  well groomed  Behavior/Demeanor:  cooperative, pleasant and calm, with good  eye contact.  Speech:  normal  Psychomotor:  normal or unremarkable    Mood:  good  Affect:  appropriate and was congruent to speech content.  Thought Process/Associations: unremarkable   Thought Content: devoid of  suicidal and violent ideation and delusions.   Perception: devoid of  hallucinations  Insight:  good.  Judgment: good.  Attention/Concentration:  Normal  Language:  Intact  Fund of Knowledge:  Average.    Memory:  Immediate recall intact, Short-term memory intact and Long-term memory intact.       Counseling:   Discussion was held with the patient today regarding concussion in general including types of injury, symptoms that are common, treatment and variability in time to recover  I have reassured the patient her symptoms are very common when a concussion is present and will improve with time. We discussed the risks and benefits of possible medication used to help concussive symptoms including risk of worsening depression with medication adjustments and even the possibility of emergence of suicidal ideations. We will assess for the appropriateness of possible psychotropic medication trials/changes. The patient will seek out appropriate emergency services should that become necessary. The patient agrees to call/message before her next visit with any questions, concerns or problems.    Visit Details:   Type of service: Video Visit    Video Start Time: 1500    Video End Time:   1536    Originating Location: Patient's home    Distant Location:  [facility]  [place]    Mode of Communication: Video Conference via  [employer]     Diagnosis managed and treated at today's visit :  Post concussion syndrome  Post concussion headache  Nausea  Dizziness  Fatigue  Insomnia  Sensitivity to light  Sound sensitivity  Concentration and Attention deficit  Memory difficulties  Anxiety d/t a medical condition  Irritability  Return to work    Total time today (40 min) in this patient encounter was spent on pre-charting, chart review, review of outside records, review of test results, interpretation of tests, patient visit and documentation and counseling and/or coordination of care. The patient is in agreement with this plan and has no further questions.    General Information:   Today you had your appointment with Aydee Delgadillo CNP     If lab work was done today as part of your evaluation you will generally be contacted via My Chart, mail, or phone with the results within 1-5 days. If there is an alarming result we will contact you by phone. Lab results come back at varying times, I generally wait until all labs are resulted before making comments on results. Please note labs are automatically released to My Chart once available.     If you need refills please contact your pharmacist. They will send a refill request to me to review. If it is a controlled substance please message me through [employer]. Please allow 3 business days for us to process all refill requests.     Please call or send a medical message through My Chart, with any questions or concerns    If you need any paperwork completed please fax forms to [phone]. Please state if you would like a copy of the completed paperwork, mailed or faxed back to the patient and a fax number to fax the paperwork to. Please allow up to 10 business days for paperwork to be completed.    ANISH German CNP M  [facility]  [facility] [address]  [address] [place] [address]  Office: [phone]  Fax: [phone]

## 2022-12-15 NOTE — LETTER
"    12/15/2022         RE: Maribel Mcgrath  [address]  [address]  [facility] [address]        Dear Colleague,    Thank you for referring your patient, Maribel Mcgrath, to the [facility]. Please see a copy of my visit note below.    Video Visit: Concussion Follow up:   Maribel Mcgrath is a 52 year old female who is being evaluated via a billable video visit       The patient has been notified of the following:     \"This video visit will be conducted via a video call between you and your provider. We have found that certain health care needs can be provided without the need for a physical exam.  This service lets us provide the care you need with a short video conversation.  If a prescription is necessary we can send it directly to your pharmacy.  If lab work is needed we can place an order for that and you can then stop by our lab to have the test done at a later time.    If during the course of the call the provider feels a video visit is not appropriate, you will not be charged for this service.\"     Patient has given verbal consent to a video visit? Yes    Visit Check In:   Orders from previous visit: Increase Vyvance, start Ritalin and Amitriptyline, we will also do labs at next appointment    Neuropsychological assessment completed    No   Currently doing PT  No    Completed Yes   Currently doing OT  No    Completed Yes   ST    Completed No   Psychology  No   Return to Work/School   Full scheduled hours  Yes     Currently on medication to help with sleep    Yes      Currently on any mental health medications     No      Currently on medication for attention, ADD/ADHD    Yes    Vyvance                                                      Workman's Comp   No     Outpatient Follow up Mild TBI (Concussion)  Evaluation:   Maribel Mcgrath chief complaint is Post Concussion Syndrome     Is patient on a controlled substance prescribed by me?  Yes    checked    Yes " "  Follow up appointment  Message sent to  for follow-up:    HPI:      Pertinent History:   Per Pt EMR: Maribel Mcgrath is a right-handed 51 year old female who presents with a head injury after a fall. Per the patient, nine days ago she spun around while brushing her teeth in her bathroom and the rug slipped out from under her, causing her to fall and hit her head. She hit her jaw and the back of her head. She denies loss of consciousness. Since then, she reports ongoing phonophobia, headache, dizziness, nausea, and appetite loss. She also notes feeling \"clumsy\", without   focal numbness or weakness. She denies trismus, drainage from her ear or mouth. She denies alcohol use since the fall. She also denies numbness, weakness, visual disturbance. She has not taken any medications for her symptoms. She is traveling to [place] in three days and wanted to be evaluated prior to this.    Date of accident :  3/19/22    Plan:        We discussed some treatment options and have elected to   reorder Vyvanse, Ritalin, and Amitriptyline    Medication Adjustment:  Ritalin 5 mg, take one tab BID  Vyvance 40 mg, take one tab every am  Amitriptyline 25 mg, take 1-2 tabs every HS    Return to Work/School   Full scheduled hours  Yes    Note completed    No      Return to clinic 10 weeks    Continue with the support of the clinic, reassurance, and redirection. Staff monitoring and ongoing assessments per team plan. This team will utilize appropriate emergency services if necessary. I will make myself available if concerns or problems arise.  The patient agrees to call/message before her next visit with any questions, concerns or problems.    Progress Note:        The patient returns to the concussion clinic for a follow up visit, She was last seen by me on 9/15/22, I increased Vyvanse, and started Ritalin and Amitriptyline. The patient reports that she didn't start the Ritalin and Amitriptyline because she wanted to discuss side " "effects first. She reports that she is doing good with the Vyvanse but still needs to \"write everything down\". She has been working a lot. She reports that symptoms do worsen throughout the day.  Overall the patient is reporting no change in headaches, trouble staying asleep and waking feeling rested. She reports improvement in all other physical, emotional and cognitive symptoms.     Subjective:        Overall improvement from last visit   Yes    Headaches:  Significant ongoing headaches Yes   Headaches: Intermittently  Improvement :No   Current Headache No   Wake with HA  Yes     Physical Symptoms:  Headache-Yes     Since last visit  Same Nausea-No         Balance problems - No  Dizziness - No  Visual problems - No  Fatigue - Yes              Since last visit  Improved    Sensitivity to light - No  Sensitivity to sound - Yes       Since last visit  Improved     Numbness/tingling - No         Cognitive Symptoms  Feeling mentally foggy -No        Since last visit  Improved    Feeling slowed down -Yes        Since last visit   Improved    Difficulty Concentrating- Yes      Since last visit   Improved    Difficulty remembering - Yes        Since last visit   Improved       Emotional Symptoms  Irritability - No  Sadness-  No  More emotional - No  Nervousness/anxiety -No    Sleep History:  Sleep less than usual - Yes    Sleep more than usual - No    Trouble falling asleep - No      Trouble staying asleep - Yes       Since last visit  Same     Wake feeling rested - No         Since last visit  Same        Exertion:         Do the above stated symptoms worsen with physical activity? Yes        Since last visit  Improved          Do the above stated symptoms worsen with cognitive activity? Yes       Since last visit  Improved            Objective:     Patient Active Problem List    Diagnosis Date Noted     ADD (attention deficit disorder) without hyperactivity 08/31/2021     Priority: Medium     Mixed hyperlipidemia " 08/31/2021     Priority: Medium     Hyperopia, bilateral 08/12/2021     Priority: Medium     Presbyopia 08/12/2021     Priority: Medium     Depression, major, single episode, moderate (H) 10/16/2019     Priority: Medium     Past Medical History:   Diagnosis Date     Anxiety      Herpes genitalis      Hyperlipidemia      Major depression     Has seen neuro due to light headedness, nausea, palpitations. Tried zoloft, paxil (did not like paxil).      Menopause present 2017    FSH very elevated  157     Past Surgical History:   Procedure Laterality Date     ADENOIDECTOMY       COSMETIC MAMMOPLASTY AUGMENTATION BILATERAL  2011     Family History   Problem Relation Age of Onset     Diabetes Maternal Grandmother      Hyperlipidemia Mother      Heart Disease Mother      Hyperlipidemia Father      Heart Disease Father      Current Outpatient Medications   Medication Sig Dispense Refill     lisdexamfetamine (VYVANSE) 40 MG capsule Take 1 capsule (40 mg) by mouth every morning 30 capsule 0     amitriptyline (ELAVIL) 25 MG tablet Take 1-2 tablets (25-50 mg) by mouth At Bedtime (Patient not taking: Reported on 12/15/2022) 60 tablet 3     methylphenidate (RITALIN) 5 MG tablet Take 1 tablet (5 mg) by mouth 2 times daily (Patient not taking: Reported on 12/15/2022) 60 tablet 0     Social History     Socioeconomic History     Marital status: Single     Spouse name: Not on file     Number of children: Not on file     Years of education: Not on file     Highest education level: Not on file   Occupational History     Employer: [employer]   Tobacco Use     Smoking status: Never     Smokeless tobacco: Never   Substance and Sexual Activity     Alcohol use: Yes     Alcohol/week: 0.0 standard drinks     Drug use: No     Sexual activity: Not Currently     Birth control/protection: Post-menopausal   Other Topics Concern     Parent/sibling w/ CABG, MI or angioplasty before 65F 55M? Not Asked   Social History Narrative     Not on file      Social Determinants of Health     Financial Resource Strain: Not on file   Food Insecurity: Not on file   Transportation Needs: Not on file   Physical Activity: Not on file   Stress: Not on file   Social Connections: Not on file   Intimate Partner Violence: Not on file   Housing Stability: Not on file       ALLERGIES  Atorvastatin    The following portions of the patient's history were reviewed and updated as appropriate: allergies, current medications, past family history, past medical history, past social history, past surgical history and problem list.    Review of Systems  A comprehensive review of systems was negative except for: What is noted above    Mental Status Examination  Alertness:  alert  and oriented  Appearance:  well groomed  Behavior/Demeanor:  cooperative, pleasant and calm, with good  eye contact.  Speech:  normal  Psychomotor:  normal or unremarkable    Mood:  good  Affect:  appropriate and was congruent to speech content.  Thought Process/Associations: unremarkable   Thought Content: devoid of  suicidal and violent ideation and delusions.   Perception: devoid of  hallucinations  Insight:  good.  Judgment: good.  Attention/Concentration:  Normal  Language:  Intact  Fund of Knowledge:  Average.    Memory:  Immediate recall intact, Short-term memory intact and Long-term memory intact.       Counseling:   Discussion was held with the patient today regarding concussion in general including types of injury, symptoms that are common, treatment and variability in time to recover  I have reassured the patient her symptoms are very common when a concussion is present and will improve with time. We discussed the risks and benefits of possible medication used to help concussive symptoms including risk of worsening depression with medication adjustments and even the possibility of emergence of suicidal ideations. We will assess for the appropriateness of possible psychotropic medication trials/changes.  The patient will seek out appropriate emergency services should that become necessary. The patient agrees to call/message before her next visit with any questions, concerns or problems.    Visit Details:   Type of service: Video Visit    Video Start Time: 1500    Video End Time:  1536    Originating Location: Patient's home    Distant Location:  [facility]  [place]    Mode of Communication: Video Conference via  [facility]     Diagnosis managed and treated at today's visit :  Post concussion syndrome  Post concussion headache  Nausea  Dizziness  Fatigue  Insomnia  Sensitivity to light  Sound sensitivity  Concentration and Attention deficit  Memory difficulties  Anxiety d/t a medical condition  Irritability  Return to work    Total time today (40 min) in this patient encounter was spent on pre-charting, chart review, review of outside records, review of test results, interpretation of tests, patient visit and documentation and counseling and/or coordination of care. The patient is in agreement with this plan and has no further questions.    General Information:   Today you had your appointment with Aydee Delgadillo CNP     If lab work was done today as part of your evaluation you will generally be contacted via My Chart, mail, or phone with the results within 1-5 days. If there is an alarming result we will contact you by phone. Lab results come back at varying times, I generally wait until all labs are resulted before making comments on results. Please note labs are automatically released to My Chart once available.     If you need refills please contact your pharmacist. They will send a refill request to me to review. If it is a controlled substance please message me through [employer]. Please allow 3 business days for us to process all refill requests.     Please call or send a medical message through My Chart, with any questions or concerns    If you need any paperwork completed please  fax forms to [phone]. Please state if you would like a copy of the completed paperwork, mailed or faxed back to the patient and a fax number to fax the paperwork to. Please allow up to 10 business days for paperwork to be completed.    ANISH German, CNP      [facility]  [facility] [address]  [address] [place] [id number]  Office: [phone]  Fax: [phone]          Again, thank you for allowing me to participate in the care of your patient.        Sincerely,        ANISH German CNP

## 2022-12-16 ENCOUNTER — TELEPHONE (OUTPATIENT)
Dept: NEUROLOGY | Facility: CLINIC | Age: 52
End: 2022-12-16

## 2022-12-16 RX ORDER — METHYLPHENIDATE HYDROCHLORIDE 5 MG/1
5 TABLET ORAL 2 TIMES DAILY
Qty: 60 TABLET | Refills: 0 | Status: SHIPPED | OUTPATIENT
Start: 2022-12-16 | End: 2023-02-15

## 2022-12-16 RX ORDER — LISDEXAMFETAMINE DIMESYLATE 40 MG/1
40 CAPSULE ORAL EVERY MORNING
Qty: 30 CAPSULE | Refills: 0 | Status: SHIPPED | OUTPATIENT
Start: 2022-12-16 | End: 2023-03-02

## 2023-01-25 NOTE — PROGRESS NOTES
SUBJECTIVE:                                                   Maribel Mcgrath is a 52 year old female who presents to clinic today for the following health issue(s):  Patient presents with:  Amenorrhea: Started spotting about two months intermittent, not bright red just more brownish, feeling cramping in lower right side.       HPI:  Last 2 months has been having dark brown spotting.   Not sexually active.   Last period 8 years ago.  Notes intermittent cramping on the right side. Occurs randomly. No correlation with spotting. Feels like started to notice 4-6 weeks ago, around the start of bleeding.  No family history of any breast or ovarian cancer.  Reports constipation in last few days, this is different than normal BMs  Denies pain with urination.  Normal pap 2021  Had colonoscopy scheduled but needed to reschedule due to COVID. Spotting started before COVID infection.    Normal TSH; Oct 2021  Had FSH elevation documented in 2018    No LMP recorded. Patient is postmenopausal..     Patient is not sexually active, .  Using not sexually active for contraception.    reports that she has never smoked. She has never used smokeless tobacco.    STD testing offered?  Declined    Health maintenance updated:  yes    Today's PHQ-2 Score:   PHQ-2 (  [employer]) 12/15/2022   Q1: Little interest or pleasure in doing things 3   Q2: Feeling down, depressed or hopeless 3   PHQ-2 Score 6   PHQ-2 Total Score (12-17 Years)- Positive if 3 or more points; Administer PHQ-A if positive -   Q1: Little interest or pleasure in doing things Nearly every day   Q2: Feeling down, depressed or hopeless Nearly every day   PHQ-2 Score 6     Today's PHQ-9 Score:   PHQ-9 SCORE 2023   PHQ-9 Total Score MyChart -   PHQ-9 Total Score 21     Today's IVAN-7 Score:   IVAN-7 SCORE 2023   Total Score 21       Problem list and histories reviewed & adjusted, as indicated.  Additional history: as documented.    Patient Active Problem  List   Diagnosis     ADD (attention deficit disorder) without hyperactivity     Depression, major, single episode, moderate (H)     Hyperopia, bilateral     Mixed hyperlipidemia     Presbyopia     Past Surgical History:   Procedure Laterality Date     ADENOIDECTOMY       COSMETIC MAMMOPLASTY AUGMENTATION BILATERAL  2011      Social History     Tobacco Use     Smoking status: Never     Smokeless tobacco: Never   Substance Use Topics     Alcohol use: Yes     Alcohol/week: 0.0 standard drinks      Problem (# of Occurrences) Relation (Name,Age of Onset)    Diabetes (1) Maternal Grandmother    Heart Disease (2) Mother, Father    Hyperlipidemia (2) Mother, Father            Current Outpatient Medications   Medication Sig     lisdexamfetamine (VYVANSE) 40 MG capsule Take 1 capsule (40 mg) by mouth every morning     amitriptyline (ELAVIL) 25 MG tablet Take 1-2 tablets (25-50 mg) by mouth At Bedtime (Patient not taking: Reported on 1/31/2023)     methylphenidate (RITALIN) 5 MG tablet Take 1 tablet (5 mg) by mouth 2 times daily (Patient not taking: Reported on 1/31/2023)     No current facility-administered medications for this visit.     Allergies   Allergen Reactions     Atorvastatin Muscle Pain (Myalgia)     Severe body cramps       ROS:  12 point review of systems negative other than symptoms noted below or in the HPI.  No urinary frequency or dysuria, bladder or kidney problems      OBJECTIVE:     /64   Wt 38.1 kg (84 lb)   BMI 16.68 kg/m    Body mass index is 16.68 kg/m .    Exam:  Constitutional:  Appearance: Well nourished, well developed alert, in no acute distress  Skin: General Inspection:  No rashes present, no lesions present, no areas of discoloration.  Neurologic:  Mental Status:  Oriented X3.  Normal strength and tone, sensory exam grossly normal, mentation intact and speech normal.    Psychiatric:  Mentation appears normal and affect normal/bright.  Pelvic Exam:  External Genitalia:     Normal  appearance for age, no discharge present, no tenderness present, no inflammatory lesions present, color normal  Vagina:     Normal vaginal vault without central or paravaginal defects, ATROPHIC  Bladder:     Nontender to palpation  Urethra:   Urethral Body:  Urethra palpation normal, urethra structural support normal   Urethral Meatus:  No erythema or lesions present  Cervix:     Appearance healthy, no lesions present, nontender to palpation, no bleeding present  Uterus:     Nontender to palpation, no masses present, position anteflexed, mobility: normal  Adnexa:     No adnexal tenderness present, no adnexal masses present  Perineum:     Perineum within normal limits, no evidence of trauma, no rashes or skin lesions present  Inguinal Lymph Nodes:     No lymphadenopathy present       In-Clinic Test Results:  No results found for this or any previous visit (from the past 24 hour(s)).    ASSESSMENT/PLAN:                                                        ICD-10-CM    1. Postmenopausal bleeding  N95.0 US Pelvic Complete with Transvaginal      2. Post menopausal problems  N95.9           There are no Patient Instructions on file for this visit.    Patient presents for evaluation for post menopausal bleeding. Pelvic exam unremarkable. Discussed causes of postmenopausal bleeding including benign and non-benign causes. Further evaluating with pelvic US. To follow-up in clinic after US, patient to reach out sooner with acute changes.    Patient also feels she is low on hormones due to menopause and is asking about estrogen therapy and concerned because she has a family history of stroke and HLD. Reassured patient that topical estrogen is a potentially reasonable option and we could consider this after further evaluation of the bleeding    (25 minutes was spent on the date of the encounter doing chart review, review of outside records, review and interpretation of pertinent test results, history and exam, documentation,  patient counseling, and further activities as noted above.)    Depression Screening Follow-up    PHQ 1/31/2023   PHQ-9 Total Score 21   Q9: Thoughts of better off dead/self-harm past 2 weeks Not at all       Does the patient currently have a mental health provider?  Yes, patient was referred back to current mental health provider.    Beth Sweet MD  [facility]

## 2023-01-31 ENCOUNTER — OFFICE VISIT (OUTPATIENT)
Dept: OBGYN | Facility: CLINIC | Age: 53
End: 2023-01-31
Payer: COMMERCIAL

## 2023-01-31 VITALS — SYSTOLIC BLOOD PRESSURE: 118 MMHG | BODY MASS INDEX: 16.68 KG/M2 | DIASTOLIC BLOOD PRESSURE: 64 MMHG | WEIGHT: 84 LBS

## 2023-01-31 DIAGNOSIS — N95.0 POSTMENOPAUSAL BLEEDING: Primary | ICD-10-CM

## 2023-01-31 PROCEDURE — 99213 OFFICE O/P EST LOW 20 MIN: CPT | Performed by: OBSTETRICS & GYNECOLOGY

## 2023-01-31 RX ORDER — ESTRADIOL 0.1 MG/G
CREAM VAGINAL
Qty: 10 G | Refills: 0 | Status: CANCELLED | OUTPATIENT
Start: 2023-01-31 | End: 2023-02-28

## 2023-01-31 ASSESSMENT — PATIENT HEALTH QUESTIONNAIRE - PHQ9
5. POOR APPETITE OR OVEREATING: NEARLY EVERY DAY
SUM OF ALL RESPONSES TO PHQ QUESTIONS 1-9: 21

## 2023-01-31 ASSESSMENT — ANXIETY QUESTIONNAIRES
6. BECOMING EASILY ANNOYED OR IRRITABLE: NEARLY EVERY DAY
5. BEING SO RESTLESS THAT IT IS HARD TO SIT STILL: NEARLY EVERY DAY
7. FEELING AFRAID AS IF SOMETHING AWFUL MIGHT HAPPEN: NEARLY EVERY DAY
IF YOU CHECKED OFF ANY PROBLEMS ON THIS QUESTIONNAIRE, HOW DIFFICULT HAVE THESE PROBLEMS MADE IT FOR YOU TO DO YOUR WORK, TAKE CARE OF THINGS AT HOME, OR GET ALONG WITH OTHER PEOPLE: EXTREMELY DIFFICULT
1. FEELING NERVOUS, ANXIOUS, OR ON EDGE: NEARLY EVERY DAY
2. NOT BEING ABLE TO STOP OR CONTROL WORRYING: NEARLY EVERY DAY
GAD7 TOTAL SCORE: 21
3. WORRYING TOO MUCH ABOUT DIFFERENT THINGS: NEARLY EVERY DAY
GAD7 TOTAL SCORE: 21

## 2023-02-08 NOTE — PROGRESS NOTES
SUBJECTIVE:                                                   Maribel Mcgrath is a 52 year old female who presents to clinic today for the following health issue(s):  Patient presents with:  Follow Up  Ultrasound    HPI:  Patient is here for Ultrasound follow-up due to episode of postmenopausal bleeding  She also has general questions about natural ways to help with menopausal symptoms and questions about libido.  She is hesitant to start HRT due to family history of heart disease and stroke.  She denies any bleeding since last appointment  She denies any abnormal discharge.    No LMP recorded. Patient is postmenopausal..     Patient is not sexually active, .  Using menopause for contraception.    reports that she has never smoked. She has never used smokeless tobacco.    STD testing offered?  Declined    Health maintenance updated:  yes    Today's PHQ-2 Score:   PHQ-2 (  [employer]) 12/15/2022   Q1: Little interest or pleasure in doing things 3   Q2: Feeling down, depressed or hopeless 3   PHQ-2 Score 6   PHQ-2 Total Score (12-17 Years)- Positive if 3 or more points; Administer PHQ-A if positive -   Q1: Little interest or pleasure in doing things Nearly every day   Q2: Feeling down, depressed or hopeless Nearly every day   PHQ-2 Score 6     Today's PHQ-9 Score:   PHQ-9 SCORE 2023   PHQ-9 Total Score MyChart -   PHQ-9 Total Score 21     Today's IVAN-7 Score:   IVAN-7 SCORE 2023   Total Score 21       Problem list and histories reviewed & adjusted, as indicated.  Additional history: as documented.    Patient Active Problem List   Diagnosis     ADD (attention deficit disorder) without hyperactivity     Depression, major, single episode, moderate (H)     Hyperopia, bilateral     Mixed hyperlipidemia     Presbyopia     Past Surgical History:   Procedure Laterality Date     ADENOIDECTOMY       COSMETIC MAMMOPLASTY AUGMENTATION BILATERAL        Social History     Tobacco Use     Smoking status:  Never     Smokeless tobacco: Never   Substance Use Topics     Alcohol use: Yes     Alcohol/week: 0.0 standard drinks      Problem (# of Occurrences) Relation (Name,Age of Onset)    Diabetes (1) Maternal Grandmother    Heart Disease (2) Mother, Father    Hyperlipidemia (2) Mother, Father            Current Outpatient Medications   Medication Sig     amitriptyline (ELAVIL) 25 MG tablet Take 1-2 tablets (25-50 mg) by mouth At Bedtime (Patient not taking: Reported on 1/31/2023)     lisdexamfetamine (VYVANSE) 40 MG capsule Take 1 capsule (40 mg) by mouth every morning     methylphenidate (RITALIN) 5 MG tablet Take 1 tablet (5 mg) by mouth 2 times daily (Patient not taking: Reported on 1/31/2023)     No current facility-administered medications for this visit.     Allergies   Allergen Reactions     Atorvastatin Muscle Pain (Myalgia)     Severe body cramps       ROS:  12 point review of systems negative other than symptoms noted below or in the HPI.  No urinary frequency or dysuria, bladder or kidney problems      OBJECTIVE:     /70   Pulse 74   Wt 38.1 kg (84 lb)   BMI 16.68 kg/m    Body mass index is 16.68 kg/m .    Exam:  Constitutional:  Appearance: Well nourished, well developed alert, in no acute distress  Psychiatric:  Mentation appears normal and affect normal/bright.     In-Clinic Test Results:  Results for orders placed or performed in visit on 02/15/23 (from the past 24 hour(s))   US Pelvic Complete with Transvaginal    Narrative    Table formatting from the original result was not included.  US Pelvic Complete with Transvaginal  Order #: [id number] Accession #: [id number]  Study Notes       Fatuma Mcintosh on 2/15/2023  1:13 PM      Gynecological Ultrasound Report  Pelvic U/S - Transabdominal and Transvaginal  [facility]  Referring Provider: Dr. Beth Sweet  Sonographer:  Fatuma Mcintosh RDMS  Indication: Bleeding/Menses- Postmenopausal bleeding  LMP: No LMP recorded. Patient is  "postmenopausal.  History:   Gynecological Ultrasonography:   Uterus: anteverted. Contour is smooth/regular.  Size: 4.67 x 3.58 x 1.95 cm  Endometrium: Thickness Total 1.23 mm  Findings: Thin  Right Ovary: 2.21 x 1.69 x .72 cm. Simple cyst .4 x .3 x .3 cm,   calcifications in ovary.  Left Ovary: 2.04 x 1.84 x .92 cm. Hypoechoic area .9x .8x .6cm  Cul de Sac Free Fluid: No free fluid     Impression:            Normal uterus and endometrium  Subcentimeter simple left ovarian cyst. No follow-up required  Calcifications noted in right ovary.  Consider follow-up in 3-6 months    Beth Sweet MD        ASSESSMENT/PLAN:                                                        ICD-10-CM    1. Postmenopausal bleeding  N95.0       2. Low libido  R68.82       3. Postmenopause  Z78.0         -discussed US findings. Normal uterus and thin endometrium.  Discussed likely cause of bleeding is vaginal atrophy.  Discussed that typically vaginal estrogen is recommended treatment.  Discussed how this helps with atrophy and prevent bleeding.  Reviewed safety of vaginal estrogen. Alexandr would like to think about this as option to help.  We also discussed that this can help with vaginal dryness and painful intercourse in postmenopausal women.   -Discussed low libido. Reviewed this is often multifactorial.  Again would recommendstarting with vaginal estrogen.  May also want to consider evaluation with sexual health physician.  -Discussed general treatment and management of postmenopause.  Recommend menopause.org website. Discussed her desire for \"natural\" ways to help with her symptoms. Discussed why bioidentical hormones are not considered standard of care and the risks of using them.    (30 minutes was spent on the date of the encounter doing chart review, review of outside records, review and interpretation of pertinent test results, history and exam, documentation, patient counseling, and further activities as noted above.)      Beth" LACEY Sweet MD  [facility] FOR [facility]

## 2023-02-15 ENCOUNTER — ANCILLARY PROCEDURE (OUTPATIENT)
Dept: ULTRASOUND IMAGING | Facility: CLINIC | Age: 53
End: 2023-02-15
Attending: OBSTETRICS & GYNECOLOGY
Payer: COMMERCIAL

## 2023-02-15 ENCOUNTER — OFFICE VISIT (OUTPATIENT)
Dept: OBGYN | Facility: CLINIC | Age: 53
End: 2023-02-15
Attending: OBSTETRICS & GYNECOLOGY
Payer: COMMERCIAL

## 2023-02-15 ENCOUNTER — TELEPHONE (OUTPATIENT)
Dept: OBGYN | Facility: CLINIC | Age: 53
End: 2023-02-15

## 2023-02-15 VITALS
WEIGHT: 84 LBS | SYSTOLIC BLOOD PRESSURE: 118 MMHG | BODY MASS INDEX: 16.68 KG/M2 | DIASTOLIC BLOOD PRESSURE: 70 MMHG | HEART RATE: 74 BPM

## 2023-02-15 DIAGNOSIS — R68.82 LOW LIBIDO: ICD-10-CM

## 2023-02-15 DIAGNOSIS — N95.0 POSTMENOPAUSAL BLEEDING: ICD-10-CM

## 2023-02-15 DIAGNOSIS — N95.0 POSTMENOPAUSAL BLEEDING: Primary | ICD-10-CM

## 2023-02-15 DIAGNOSIS — Z78.0 POSTMENOPAUSE: ICD-10-CM

## 2023-02-15 PROCEDURE — 76830 TRANSVAGINAL US NON-OB: CPT | Performed by: OBSTETRICS & GYNECOLOGY

## 2023-02-15 PROCEDURE — 99214 OFFICE O/P EST MOD 30 MIN: CPT | Performed by: OBSTETRICS & GYNECOLOGY

## 2023-02-15 PROCEDURE — 76856 US EXAM PELVIC COMPLETE: CPT | Performed by: OBSTETRICS & GYNECOLOGY

## 2023-02-15 NOTE — TELEPHONE ENCOUNTER
Beth Sweet MD  You 17 minutes ago (4:23 PM)     DO  We did talk about these.  This is what the follow up Ultrasound in 3 months is for          Called pt and informed of f/u for right calcifications. Left simple cyst is not of concern and no follow up indicated. She has US and f/u in May and will f/u at that time. No lab per Dr Sweet.    Pt verbalized understanding, in agreement with plan, and voiced no further questions.  Pinky Karimi RN on 2/15/2023 at 4:51 PM

## 2023-02-15 NOTE — TELEPHONE ENCOUNTER
Pt reading her US report on line from today. Askign about the: Calcifications noted in right ovary.  Consider follow-up in 3-6 months  Beth Sweet MD     States Dr Sweet did not mention these in the apt and asking what f/u recommendations are and is there a blood test that cna be done to r/o cancerous?    Routing to Dr Sweet to advise.    Pinky Karimi RN on 2/15/2023 at 3:52 PM

## 2023-02-27 ENCOUNTER — TELEPHONE (OUTPATIENT)
Dept: OBGYN | Facility: CLINIC | Age: 53
End: 2023-02-27
Payer: COMMERCIAL

## 2023-02-27 ENCOUNTER — TRANSCRIBE ORDERS (OUTPATIENT)
Dept: OTHER | Age: 53
End: 2023-02-27

## 2023-02-27 ASSESSMENT — PATIENT HEALTH QUESTIONNAIRE - PHQ9
10. IF YOU CHECKED OFF ANY PROBLEMS, HOW DIFFICULT HAVE THESE PROBLEMS MADE IT FOR YOU TO DO YOUR WORK, TAKE CARE OF THINGS AT HOME, OR GET ALONG WITH OTHER PEOPLE: VERY DIFFICULT
SUM OF ALL RESPONSES TO PHQ QUESTIONS 1-9: 22
SUM OF ALL RESPONSES TO PHQ QUESTIONS 1-9: 22

## 2023-02-27 NOTE — TELEPHONE ENCOUNTER
Pt called in requesting to be seen by  OBGYN group for 2nd opinion. Lives in [facility] [place], Recently seen with [facility] 2/15 for postmenopausal bleeding.    US per 2/15 visit indicate:Normal uterus and endometrium  Subcentimeter simple left ovarian cyst. No follow-up required  Calcifications noted in right ovary.  Consider follow-up in 3-6 months. Per OV: Discussed likely cause of bleeding is vaginal atrophy. Discussed that typically vaginal estrogen is recommended treatment.    Per visit, pt feeling very anxious her symptoms could be cancerous. She is not ok waiting for follow-up in 3-6 mo, would like different plan of care with new provider.    Scheduled pt with 1st available, Dr. Agbeh 3/13. Routing to [place] to see if pt is able to be seen sooner, per her request.    Randi Atkins RN on 2/27/2023 at 11:05 AM

## 2023-02-27 NOTE — TELEPHONE ENCOUNTER
Reason for call:  Other     Patient called regarding (reason for call): call back    Additional comments: patient wants to talk with Dr. Sweet or a nurse regarding her ovary issues and is worried that she may have ovarian cancer.     Phone number to reach patient:  Cell number on file:    Telephone Information:   Mobile [phone]     Best Time:  any    Can we leave a detailed message on this number?  YES    Travel screening: Not Applicable

## 2023-02-27 NOTE — TELEPHONE ENCOUNTER
2/15/23 US and OV w Dr Sweet  Normal uterus and endometrium  Subcentimeter simple left ovarian cyst. No follow-up required  Calcifications noted in right ovary.  Consider follow-up in 3-6 months   Beth Sweet MD     Attempted to call back - LMTCB    Pinky Karimi RN on 2/27/2023 at 10:29 AM    Pt returned call  100% not comfortable with waiting to recheck right ovarian calcifications.  Cramp in right side still continues on/off - this started 3 months ago.  Spotting brought her in and has been postmenopausal 8 yrs ago. She sent her info to [facility] and waiting to hear if they will even see her. She didn't feel she was listened to at her last visit and has multiple more questions and concerns about ovarian cancer. She admits to be a very anxious person and does not want to sit on this any longer - any other tests to do including the .    This nurse supported pt concerns/worries and supported a 2nd opinion to calm her concerns/reassure. She doesn't know if [facility] will see her but still waiting. Recommended she have a phone visit w Dr Sweet to discuss further and ask the questions she has. Encouraged her to write the questions down prior to apt. Scheduled phone visit tomorrow.    Pt verbalized understanding, in agreement with plan, and voiced no further questions.  Pinky Karimi RN on 2/27/2023 at 11:20 AM

## 2023-02-27 NOTE — TELEPHONE ENCOUNTER
See other telephone encounter. Patient scheduled a follow-up with Pam to have questions answered and would still like second opinion with [facility].    She is scheduled with Dr. Agbeh on 3/13/23. Would like to keep this appointment but add to waitlist for an earlier appointment that opens up at the [place] location.    Chang Vizcaino, CMA

## 2023-02-28 ENCOUNTER — VIRTUAL VISIT (OUTPATIENT)
Dept: OBGYN | Facility: CLINIC | Age: 53
End: 2023-02-28
Payer: COMMERCIAL

## 2023-02-28 ENCOUNTER — LAB (OUTPATIENT)
Dept: LAB | Facility: CLINIC | Age: 53
End: 2023-02-28
Payer: COMMERCIAL

## 2023-02-28 ENCOUNTER — VIRTUAL VISIT (OUTPATIENT)
Dept: NEUROLOGY | Facility: CLINIC | Age: 53
End: 2023-02-28
Payer: COMMERCIAL

## 2023-02-28 DIAGNOSIS — F07.81 POST CONCUSSION SYNDROME: Primary | ICD-10-CM

## 2023-02-28 DIAGNOSIS — N83.201 CYST OF RIGHT OVARY: Primary | ICD-10-CM

## 2023-02-28 DIAGNOSIS — N83.201 CYST OF RIGHT OVARY: ICD-10-CM

## 2023-02-28 LAB — CANCER AG125 SERPL-ACNC: 7 U/ML

## 2023-02-28 PROCEDURE — 86304 IMMUNOASSAY TUMOR CA 125: CPT

## 2023-02-28 PROCEDURE — 36415 COLL VENOUS BLD VENIPUNCTURE: CPT

## 2023-02-28 PROCEDURE — 99443 PR PHYSICIAN TELEPHONE EVALUATION 21-30 MIN: CPT | Mod: 93 | Performed by: OBSTETRICS & GYNECOLOGY

## 2023-02-28 PROCEDURE — 99215 OFFICE O/P EST HI 40 MIN: CPT | Mod: VID | Performed by: NURSE PRACTITIONER

## 2023-02-28 NOTE — Clinical Note
"    2/28/2023         RE: Maribel Mcgrath  [address]  [address]  [place] [address]        Dear Colleague,    Thank you for referring your patient, Maribel Mcgrath, to the [facility]. Please see a copy of my visit note below.    Video-Visit Details    Type of service:  Video Visit    Video Start Time (time video started): ***    Video End Time (time video stopped): ***    Originating Location (pt. Location): Home    {PROVIDER LOCATION On-site should be selected for visits conducted from your clinic location or adjoining [facility] hospital, academic office, or other nearby [facility] building. Off-site should be selected for all other provider locations, including home:[id number]}    Distant Location (provider location):  {virtual location provider:[id number]}    Mode of Communication:  Video Conference via [employer] for HPI/ROS submitted by the patient on 2/27/2023  If you checked off any problems, how difficult have these problems made it for you to do your work, take care of things at home, or get along with other people?: Very difficult  PHQ9 TOTAL SCORE: 22        Video Visit: Concussion Follow up:   Maribel Mcgrath is a 52 year old female who is being evaluated via a billable video visit       The patient has been notified of the following:     \"This video visit will be conducted via a video call between you and your provider. We have found that certain health care needs can be provided without the need for a physical exam.  This service lets us provide the care you need with a short video conversation.  If a prescription is necessary we can send it directly to your pharmacy.  If lab work is needed we can place an order for that and you can then stop by our lab to have the test done at a later time.    If during the course of the call the provider feels a video visit is not appropriate, you will not be charged for this service.\"     Patient has given " "verbal consent to a video visit? Yes    Visit Check In:   Orders from previous visit:  reorder Vyvanse, Ritalin, and Amitriptyline  Neuropsychological assessment completed    No   Currently doing PT  No    Completed Yes   Currently doing OT  No    Completed Yes   ST    Completed No   Psychology  No   Return to Work/School   Full scheduled hours  Yes     Currently on medication to help with sleep    Yes      Currently on any mental health medications     No      Currently on medication for attention, ADD/ADHD    Yes    Vyvanse and Ritalin                                                      Workman's Comp   No     Outpatient Follow up Mild TBI (Concussion)  Evaluation:   Maribel Mcgrath chief complaint is Post Concussion Syndrome     Is patient on a controlled substance prescribed by me?  Yes    checked    Yes   Follow up appointment  Message sent to  for follow-up:    HPI:      Pertinent History:   Per Pt EMR: Maribel Mcgrath is a right-handed 51 year old female who presents with a head injury after a fall. Per the patient, nine days ago she spun around while brushing her teeth in her bathroom and the rug slipped out from under her, causing her to fall and hit her head. She hit her jaw and the back of her head. She denies loss of consciousness. Since then, she reports ongoing phonophobia, headache, dizziness, nausea, and appetite loss. She also notes feeling \"clumsy\", without   focal numbness or weakness. She denies trismus, drainage from her ear or mouth. She denies alcohol use since the fall. She also denies numbness, weakness, visual disturbance. She has not taken any medications for her symptoms. She is traveling to [place] in three days and wanted to be evaluated prior to this.    Date of accident :  3/19/22    Plan:        We discussed some treatment options and have elected to  Continue with current therapies.    Medication Adjustment:  No medication changes    Return to Work/School   Full " "scheduled hours  Yes    Note completed    No      Return to clinic 3 months    Continue with the support of the clinic, reassurance, and redirection. Staff monitoring and ongoing assessments per team plan. This team will utilize appropriate emergency services if necessary. I will make myself available if concerns or problems arise.  The patient agrees to call/message before her next visit with any questions, concerns or problems.    Progress Note:        The patient returns to the concussion clinic for a follow up visit, She was last seen by me on 12/15/22, I reordered Ritalin, Vyvanse and Amitriptyline for the patient.  Patient reports that she continues to improve.  Patient states that she has had a lot more \"stress\" since her last appointment.  She has had a relationship and some medical issues.  Patient reports that her increase in Vyvanse has been very helpful with cognitive issues.  Overall patient is reporting improvement in cognitive symptoms.  Patient reports no change in all other physical and emotional symptoms.     Subjective:        Overall improvement from last visit   Yes    Physical Symptoms:  Headache-No  Nausea-No         Balance problems - No  Dizziness - No  Visual problems - No  Fatigue - Yes              Since last visit  Same    Sensitivity to light - No  Sensitivity to sound - Yes       Since last visit Same   Numbness/tingling - No         Cognitive Symptoms  Feeling mentally foggy -No        Since last visit  Improved    Feeling confused -Yes        Since last visit   Improved    Difficulty Concentrating- Yes      Since last visit   Improved    Difficulty remembering - Yes        Since last visit   Improved       Emotional Symptoms  Irritability - No  Sadness-  No  More emotional - No  Nervousness/anxiety -No    Sleep History:  Sleep less than usual - Yes    Sleep more than usual - No    Trouble falling asleep - No      Trouble staying asleep - Yes       Since last visit  Same     Wake " feeling rested - No         Since last visit  Same        Exertion:         Do the above stated symptoms worsen with physical activity? Yes        Since last visit  Improved          Do the above stated symptoms worsen with cognitive activity? Yes       Since last visit  Improved            Objective:     Patient Active Problem List    Diagnosis Date Noted     ADD (attention deficit disorder) without hyperactivity 08/31/2021     Priority: Medium     Mixed hyperlipidemia 08/31/2021     Priority: Medium     Hyperopia, bilateral 08/12/2021     Priority: Medium     Presbyopia 08/12/2021     Priority: Medium     Depression, major, single episode, moderate (H) 10/16/2019     Priority: Medium     Past Medical History:   Diagnosis Date     Anxiety      Herpes genitalis      Hyperlipidemia      Major depression     Has seen neuro due to light headedness, nausea, palpitations. Tried zoloft, paxil (did not like paxil).      Menopause present 2017    FSH very elevated  157     Past Surgical History:   Procedure Laterality Date     ADENOIDECTOMY       COSMETIC MAMMOPLASTY AUGMENTATION BILATERAL  2011     Family History   Problem Relation Age of Onset     Diabetes Maternal Grandmother      Hyperlipidemia Mother      Heart Disease Mother      Hyperlipidemia Father      Heart Disease Father      Current Outpatient Medications   Medication Sig Dispense Refill     lisdexamfetamine (VYVANSE) 40 MG capsule Take 1 capsule (40 mg) by mouth every morning 30 capsule 0     Social History     Socioeconomic History     Marital status: Single     Spouse name: Not on file     Number of children: Not on file     Years of education: Not on file     Highest education level: Not on file   Occupational History     Employer: [employer]   Tobacco Use     Smoking status: Never     Smokeless tobacco: Never   Substance and Sexual Activity     Alcohol use: Yes     Alcohol/week: 0.0 standard drinks     Drug use: No     Sexual activity: Not Currently      Birth control/protection: Post-menopausal   Other Topics Concern     Parent/sibling w/ CABG, MI or angioplasty before 65F 55M? Not Asked   Social History Narrative     Not on file     Social Determinants of Health     Financial Resource Strain: Not on file   Food Insecurity: Not on file   Transportation Needs: Not on file   Physical Activity: Not on file   Stress: Not on file   Social Connections: Not on file   Intimate Partner Violence: Not on file   Housing Stability: Not on file       ALLERGIES  Atorvastatin    The following portions of the patient's history were reviewed and updated as appropriate: allergies, current medications, past family history, past medical history, past social history, past surgical history and problem list.    Review of Systems  A comprehensive review of systems was negative except for: What is noted above    Mental Status Examination  Alertness:  alert  and oriented  Appearance:  well groomed  Behavior/Demeanor:  cooperative, pleasant and calm, with good  eye contact.  Speech:  normal  Psychomotor:  normal or unremarkable    Mood:  good  Affect:  appropriate and was congruent to speech content.  Thought Process/Associations: unremarkable   Thought Content: devoid of  suicidal and violent ideation and delusions.   Perception: devoid of  hallucinations  Insight:  good.  Judgment: good.  Attention/Concentration:  Normal  Language:  Intact  Fund of Knowledge:  Average.    Memory:  Immediate recall intact, Short-term memory intact and Long-term memory intact.       Counseling:   Discussion was held with the patient today regarding concussion in general including types of injury, symptoms that are common, treatment and variability in time to recover  I have reassured the patient her symptoms are very common when a concussion is present and will improve with time. We discussed the risks and benefits of possible medication used to help concussive symptoms including risk of worsening depression  with medication adjustments and even the possibility of emergence of suicidal ideations. We will assess for the appropriateness of possible psychotropic medication trials/changes. The patient will seek out appropriate emergency services should that become necessary. The patient agrees to call/message before her next visit with any questions, concerns or problems.    Visit Details:   Type of service: Video Visit     Video Start Time (time video started): 1003     Video End Time (time video stopped): 1101    Originating Location (pt. Location): Home     {PROVIDER LOCATION On-site should be selected for visits conducted from your clinic location or adjoining [facility] hospital, academic office, or other nearby [facility] building. Off-site should be selected for all other provider locations, including home:[id number]}     Distant Location (provider location):  Off-site     Mode of Communication:  Video Conference via [employer]    Diagnosis managed and treated at today's visit :  Post concussion syndrome  Post concussion headache  Nausea  Dizziness  Fatigue  Insomnia  Sensitivity to light  Sound sensitivity  Concentration and Attention deficit  Memory difficulties  Anxiety d/t a medical condition  Irritability  Return to work    Total time today (65 min) in this patient encounter was spent on pre-charting, chart review, review of outside records, review of test results, interpretation of tests, patient visit and documentation and counseling and/or coordination of care. The patient is in agreement with this plan and has no further questions.    General Information:   Today you had your appointment with Aydee Delgadillo CNP     If lab work was done today as part of your evaluation you will generally be contacted via My Chart, mail, or phone with the results within 1-5 days. If there is an alarming result we will contact you by phone. Lab results come back at varying times, I generally wait until all labs are resulted before making  comments on results. Please note labs are automatically released to My Chart once available.     If you need refills please contact your pharmacist. They will send a refill request to me to review. If it is a controlled substance please message me through [facility]. Please allow 3 business days for us to process all refill requests.     Please call or send a medical message through My Chart, with any questions or concerns    If you need any paperwork completed please fax forms to [phone]. Please state if you would like a copy of the completed paperwork, mailed or faxed back to the patient and a fax number to fax the paperwork to. Please allow up to 10 business days for paperwork to be completed.    ANISH German, CNP      [facility]  [facility] [address]  [address] [place] [id number]  Office: [phone]  Fax: [phone]          Again, thank you for allowing me to participate in the care of your patient.        Sincerely,        ANISH German CNP

## 2023-02-28 NOTE — PROGRESS NOTES
"Video Visit: Concussion Follow up:   Maribel Mcgrath is a 52 year old female who is being evaluated via a billable video visit       The patient has been notified of the following:     \"This video visit will be conducted via a video call between you and your provider. We have found that certain health care needs can be provided without the need for a physical exam.  This service lets us provide the care you need with a short video conversation.  If a prescription is necessary we can send it directly to your pharmacy.  If lab work is needed we can place an order for that and you can then stop by our lab to have the test done at a later time.    If during the course of the call the provider feels a video visit is not appropriate, you will not be charged for this service.\"     Patient has given verbal consent to a video visit? Yes    Visit Check In:   Orders from previous visit:  reorder Vyvanse, Ritalin, and Amitriptyline  Neuropsychological assessment completed    No   Currently doing PT  No    Completed Yes   Currently doing OT  No    Completed Yes   ST    Completed No   Psychology  No   Return to Work/School   Full scheduled hours  Yes     Currently on medication to help with sleep    Yes      Currently on any mental health medications     No      Currently on medication for attention, ADD/ADHD    Yes    Vyvanse and Ritalin                                                      Workman's Comp   No     Outpatient Follow up Mild TBI (Concussion)  Evaluation:   Maribel Mcgrath chief complaint is Post Concussion Syndrome     Is patient on a controlled substance prescribed by me?  Yes    checked    Yes   Follow up appointment  Message sent to  for follow-up:    HPI:      Pertinent History:   Per Pt EMR: Maribel Mcgrath is a right-handed 51 year old female who presents with a head injury after a fall. Per the patient, nine days ago she spun around while brushing her teeth in her bathroom and the rug " "slipped out from under her, causing her to fall and hit her head. She hit her jaw and the back of her head. She denies loss of consciousness. Since then, she reports ongoing phonophobia, headache, dizziness, nausea, and appetite loss. She also notes feeling \"clumsy\", without   focal numbness or weakness. She denies trismus, drainage from her ear or mouth. She denies alcohol use since the fall. She also denies numbness, weakness, visual disturbance. She has not taken any medications for her symptoms. She is traveling to [place] in three days and wanted to be evaluated prior to this.    Date of accident :  3/19/22    Plan:        We discussed some treatment options and have elected to  Continue with current therapies.    Medication Adjustment:  No medication changes    Return to Work/School   Full scheduled hours  Yes    Note completed    No      Return to clinic 3 months    Continue with the support of the clinic, reassurance, and redirection. Staff monitoring and ongoing assessments per team plan. This team will utilize appropriate emergency services if necessary. I will make myself available if concerns or problems arise.  The patient agrees to call/message before her next visit with any questions, concerns or problems.    Progress Note:        The patient returns to the concussion clinic for a follow up visit, She was last seen by me on 12/15/22, I reordered Ritalin, Vyvanse and Amitriptyline for the patient.  Patient reports that she continues to improve.  Patient states that she has had a lot more \"stress\" since her last appointment.  She has had a relationship and some medical issues.  Patient reports that her increase in Vyvanse has been very helpful with cognitive issues.  Overall patient is reporting improvement in cognitive symptoms.  Patient reports no change in all other physical and emotional symptoms.     Subjective:        Overall improvement from last visit   Yes    Physical " Symptoms:  Headache-No  Nausea-No         Balance problems - No  Dizziness - No  Visual problems - No  Fatigue - Yes              Since last visit  Same    Sensitivity to light - No  Sensitivity to sound - Yes       Since last visit Same   Numbness/tingling - No         Cognitive Symptoms  Feeling mentally foggy -No        Since last visit  Improved    Feeling confused -Yes        Since last visit   Improved    Difficulty Concentrating- Yes      Since last visit   Improved    Difficulty remembering - Yes        Since last visit   Improved       Emotional Symptoms  Irritability - No  Sadness-  No  More emotional - No  Nervousness/anxiety -No    Sleep History:  Sleep less than usual - Yes    Sleep more than usual - No    Trouble falling asleep - No      Trouble staying asleep - Yes       Since last visit  Same     Wake feeling rested - No         Since last visit  Same        Exertion:         Do the above stated symptoms worsen with physical activity? Yes        Since last visit  Improved          Do the above stated symptoms worsen with cognitive activity? Yes       Since last visit  Improved            Objective:     Patient Active Problem List    Diagnosis Date Noted     ADD (attention deficit disorder) without hyperactivity 08/31/2021     Priority: Medium     Mixed hyperlipidemia 08/31/2021     Priority: Medium     Hyperopia, bilateral 08/12/2021     Priority: Medium     Presbyopia 08/12/2021     Priority: Medium     Depression, major, single episode, moderate (H) 10/16/2019     Priority: Medium     Past Medical History:   Diagnosis Date     Anxiety      Herpes genitalis      Hyperlipidemia      Major depression     Has seen neuro due to light headedness, nausea, palpitations. Tried zoloft, paxil (did not like paxil).      Menopause present 2017    FSH very elevated  157     Past Surgical History:   Procedure Laterality Date     ADENOIDECTOMY       COSMETIC MAMMOPLASTY AUGMENTATION BILATERAL  2011     Family  History   Problem Relation Age of Onset     Diabetes Maternal Grandmother      Hyperlipidemia Mother      Heart Disease Mother      Hyperlipidemia Father      Heart Disease Father      Current Outpatient Medications   Medication Sig Dispense Refill     lisdexamfetamine (VYVANSE) 40 MG capsule Take 1 capsule (40 mg) by mouth every morning 30 capsule 0     Social History     Socioeconomic History     Marital status: Single     Spouse name: Not on file     Number of children: Not on file     Years of education: Not on file     Highest education level: Not on file   Occupational History     Employer: [employer]   Tobacco Use     Smoking status: Never     Smokeless tobacco: Never   Substance and Sexual Activity     Alcohol use: Yes     Alcohol/week: 0.0 standard drinks     Drug use: No     Sexual activity: Not Currently     Birth control/protection: Post-menopausal   Other Topics Concern     Parent/sibling w/ CABG, MI or angioplasty before 65F 55M? Not Asked   Social History Narrative     Not on file     Social Determinants of Health     Financial Resource Strain: Not on file   Food Insecurity: Not on file   Transportation Needs: Not on file   Physical Activity: Not on file   Stress: Not on file   Social Connections: Not on file   Intimate Partner Violence: Not on file   Housing Stability: Not on file       ALLERGIES  Atorvastatin    The following portions of the patient's history were reviewed and updated as appropriate: allergies, current medications, past family history, past medical history, past social history, past surgical history and problem list.    Review of Systems  A comprehensive review of systems was negative except for: What is noted above    Mental Status Examination  Alertness:  alert  and oriented  Appearance:  well groomed  Behavior/Demeanor:  cooperative, pleasant and calm, with good  eye contact.  Speech:  normal  Psychomotor:  normal or unremarkable    Mood:  good  Affect:  appropriate and was  congruent to speech content.  Thought Process/Associations: unremarkable   Thought Content: devoid of  suicidal and violent ideation and delusions.   Perception: devoid of  hallucinations  Insight:  good.  Judgment: good.  Attention/Concentration:  Normal  Language:  Intact  Fund of Knowledge:  Average.    Memory:  Immediate recall intact, Short-term memory intact and Long-term memory intact.       Counseling:   Discussion was held with the patient today regarding concussion in general including types of injury, symptoms that are common, treatment and variability in time to recover  I have reassured the patient her symptoms are very common when a concussion is present and will improve with time. We discussed the risks and benefits of possible medication used to help concussive symptoms including risk of worsening depression with medication adjustments and even the possibility of emergence of suicidal ideations. We will assess for the appropriateness of possible psychotropic medication trials/changes. The patient will seek out appropriate emergency services should that become necessary. The patient agrees to call/message before her next visit with any questions, concerns or problems.    Visit Details:   Type of service: Video Visit     Video Start Time (time video started): 1003     Video End Time (time video stopped): 1101    Originating Location (pt. Location): Home          Distant Location (provider location):  Off-site     Mode of Communication:  Video Conference via [employer]    Diagnosis managed and treated at today's visit :  Post concussion syndrome  Post concussion headache  Nausea  Dizziness  Fatigue  Insomnia  Sensitivity to light  Sound sensitivity  Concentration and Attention deficit  Memory difficulties  Anxiety d/t a medical condition  Irritability  Return to work    Total time today (65 min) in this patient encounter was spent on pre-charting, chart review, review of outside records, review of  test results, interpretation of tests, patient visit and documentation and counseling and/or coordination of care. The patient is in agreement with this plan and has no further questions.    General Information:   Today you had your appointment with Aydee Delgadillo CNP     If lab work was done today as part of your evaluation you will generally be contacted via My Chart, mail, or phone with the results within 1-5 days. If there is an alarming result we will contact you by phone. Lab results come back at varying times, I generally wait until all labs are resulted before making comments on results. Please note labs are automatically released to My Chart once available.     If you need refills please contact your pharmacist. They will send a refill request to me to review. If it is a controlled substance please message me through [employer]. Please allow 3 business days for us to process all refill requests.     Please call or send a medical message through My Chart, with any questions or concerns    If you need any paperwork completed please fax forms to [phone]. Please state if you would like a copy of the completed paperwork, mailed or faxed back to the patient and a fax number to fax the paperwork to. Please allow up to 10 business days for paperwork to be completed.    ANISH German CNP      [facility]  [facility] [address]  [address] [place] [id number]  Office: [phone]  Fax: [phone]

## 2023-02-28 NOTE — PROGRESS NOTES
Maribel Mcgrath is a 52 year old female who is being evaluated via a billable telephone visit.      What phone number would you like to be contacted at? [phone]  How would you like to obtain your AVS? MyChart      Originating Location (pt. Location): home      Distant Location (provider location):  On-site      SUBJECTIVE:                                                   Maribel Mcgrath is a 52 year old female who presents for virtual visit today for the following health issue(s):  Patient presents with:  Follow Up: Ultrasound       Additional information:     HPI:  Patient has phone follow-up visit for pelvic Ultrasound.   Patient is worried that she has ovarian cancer.  Has been looking up information on the internet.  Would like to further discuss reason for waiting vs. More immediate action.      No LMP recorded. Patient is postmenopausal..     Patient is not sexually active, .  Using not sexually active for contraception.    reports that she has never smoked. She has never used smokeless tobacco.      Health maintenance updated:  yes    Today's PHQ-2 Score:   PHQ-2 (  [employer]) 2023   Q1: Little interest or pleasure in doing things 3   Q2: Feeling down, depressed or hopeless 3   PHQ-2 Score 6   PHQ-2 Total Score (12-17 Years)- Positive if 3 or more points; Administer PHQ-A if positive -   Q1: Little interest or pleasure in doing things Nearly every day   Q2: Feeling down, depressed or hopeless Nearly every day   PHQ-2 Score 6     Today's PHQ-9 Score:   PHQ-9 SCORE 2023   PHQ-9 Total Score Health system 22 (Severe depression)   PHQ-9 Total Score 22     Today's IVAN-7 Score:   IVAN-7 SCORE 2023   Total Score 21       Problem list and histories reviewed & adjusted, as indicated.  Additional history: as documented.    Patient Active Problem List   Diagnosis     ADD (attention deficit disorder) without hyperactivity     Depression, major, single episode, moderate (H)     Hyperopia,  bilateral     Mixed hyperlipidemia     Presbyopia     Past Surgical History:   Procedure Laterality Date     ADENOIDECTOMY       COSMETIC MAMMOPLASTY AUGMENTATION BILATERAL  2011      Social History     Tobacco Use     Smoking status: Never     Smokeless tobacco: Never   Substance Use Topics     Alcohol use: Yes     Alcohol/week: 0.0 standard drinks      Problem (# of Occurrences) Relation (Name,Age of Onset)    Diabetes (1) Maternal Grandmother    Heart Disease (2) Mother, Father    Hyperlipidemia (2) Mother, Father            Current Outpatient Medications   Medication Sig     lisdexamfetamine (VYVANSE) 40 MG capsule Take 1 capsule (40 mg) by mouth every morning     No current facility-administered medications for this visit.     Allergies   Allergen Reactions     Atorvastatin Muscle Pain (Myalgia)     Severe body cramps         OBJECTIVE:     No vitals were obtained today due to virtual visit.    Physical Exam  GENERAL: Healthy, alert and no distress  EYES: Eyes grossly normal to inspection.  No discharge or erythema, or obvious scleral/conjunctival abnormalities.  RESP: No audible wheeze, cough, or visible cyanosis.  No visible retractions or increased work of breathing.    NEURO: Cranial nerves grossly intact.  Mentation and speech appropriate for age.      ASSESSMENT/PLAN:                                                      Phone call duration: 16 minutes  10 additional minutes were spent on the date of the encounter doing chart review, review of outside records, review and interpretation of pertinent test results, history and exam, documentation, patient counseling, and further activities as noted above in addition to time spent on phone with patient.        ICD-10-CM    1. Cyst of right ovary  N83.201         -Reviewed literature pertaining to findings on her ovary.  Discussed that the data that I could find, that typically when there are small calcifications, which is the case for her that these are  benign.  Discussed suspected causes of calcifications and recommendation for follow-up. Patient would feel more comfortable with CA-125, even though we did discuss that this is not considered a screening test and that a negative or positive value does not completely exclude or include ovarian cancer. We discussed that if value is normal, plan will be to continue with plan for repeat US in 3 months, if it is elevated I will plan to refer to Gyn/Oncology for further evaluation.    Beth Sweet MD  [facility] [place]

## 2023-02-28 NOTE — NURSING NOTE
CONCUSSION SYMPTOMS ASSESSMENT 9/13/2022 12/15/2022 2/27/2023   Headache or Pressure In Head 0 - none 1 - mild 0 - none   Upset Stomach or Throwing Up 0 - none 0 - none 0 - none   Problems with Balance 2 - mild to moderate 0 - none 1 - mild   Feeling Dizzy 0 - none 0 - none 1 - mild   Sensitivity to Light 0 - none 0 - none 0 - none   Sensitivity to Noise 3 - moderate 1 - mild 1 - mild   Mood Changes 0 - none 0 - none 0 - none   Feeling sluggish, hazy, or foggy 3 - moderate 3 - moderate 1 - mild   Trouble Concentrating, Lack of Focus 3 - moderate 3 - moderate 2 - mild to moderate   Motion Sickness 0 - none 0 - none 3 - moderate   Vision Changes 0 - none 0 - none 0 - none   Memory Problems 3 - moderate 3 - moderate 2 - mild to moderate   Feeling Confused 2 - mild to moderate 2 - mild to moderate 1 - mild   Neck Pain 3 - moderate 0 - none 0 - none   Trouble Sleeping 3 - moderate 3 - moderate 4 - moderate to severe   Total Number of Symptoms 8 7 9   Symptom Severity Score 22 16 16

## 2023-02-28 NOTE — PROGRESS NOTES
Video-Visit Details    Type of service:  Video Visit    Video Start Time (time video started): 1003    Video End Time (time video stopped): 1101    Originating Location (pt. Location): Home        Distant Location (provider location):  Off-site    Mode of Communication:  Video Conference via [employer]        Answers for HPI/ROS submitted by the patient on 2/27/2023  If you checked off any problems, how difficult have these problems made it for you to do your work, take care of things at home, or get along with other people?: Very difficult  PHQ9 TOTAL SCORE: 22

## 2023-03-02 ENCOUNTER — MYC REFILL (OUTPATIENT)
Dept: NEUROLOGY | Facility: CLINIC | Age: 53
End: 2023-03-02
Payer: COMMERCIAL

## 2023-03-02 DIAGNOSIS — R41.840 ATTENTION AND CONCENTRATION DEFICIT: ICD-10-CM

## 2023-03-02 DIAGNOSIS — F07.81 POST CONCUSSION SYNDROME: ICD-10-CM

## 2023-03-02 DIAGNOSIS — F98.8 ADD (ATTENTION DEFICIT DISORDER) WITHOUT HYPERACTIVITY: ICD-10-CM

## 2023-03-04 ENCOUNTER — MYC REFILL (OUTPATIENT)
Dept: NEUROLOGY | Facility: CLINIC | Age: 53
End: 2023-03-04
Payer: COMMERCIAL

## 2023-03-04 DIAGNOSIS — R41.840 ATTENTION AND CONCENTRATION DEFICIT: ICD-10-CM

## 2023-03-04 DIAGNOSIS — F98.8 ADD (ATTENTION DEFICIT DISORDER) WITHOUT HYPERACTIVITY: ICD-10-CM

## 2023-03-04 DIAGNOSIS — F07.81 POST CONCUSSION SYNDROME: ICD-10-CM

## 2023-03-06 RX ORDER — LISDEXAMFETAMINE DIMESYLATE 40 MG/1
40 CAPSULE ORAL EVERY MORNING
Qty: 30 CAPSULE | Refills: 0 | Status: SHIPPED | OUTPATIENT
Start: 2023-03-06 | End: 2023-05-24

## 2023-05-16 NOTE — PROGRESS NOTES
[facility]    Outpatient Occupational Therapy Discharge Note  Patient: Maribel Mcgrath  : 1970    Beginning/End Dates of Reporting Period:  22 to 9/15/22    Referring Provider:  Aydee Ceballos Diagnosis: Concussoin    Client Self Report: Patient reports that she has always been disorganized and her ADD has gotten worse    Objective Measurements:     Objective Measure: MoCA   Details:  (normal is 26+)                                              Outcome Measures (most recent score):  Concussion Symptom Assessment (score out of 90). A higher score indicates greater impairment:      Goals:     Goal Identifier 1-Managing Symptoms   Goal Description Patient will identify 3 strategies (self-awareness and self-management of symptoms, energy conservation strategies, etc.) to decrease her post-concussion symptoms (fatigue, sleep disturbance, headaches, etc.), for increased independence during ADL/IADLs.   Target Date 22   Date Met      Progress (detail required for progress note):       Goal Identifier 2-Attention   Goal Description Pt will demonstrate 90% accuracy on complex calendar management task with use of 1-2 strategies to improve attention and organization with mod I.   Target Date 22   Date Met      Progress (detail required for progress note):       Goal Identifier 3-Memory   Goal Description Patient will demonstrate improved memory skills by completing short-term memory tasks in occupational therapy with 85-90% accuracy using compensatory strategies for increased safety and independence with ADL/IADLS (remembering to take medications, appts, etc.).   Target Date 22   Date Met      Progress (detail required for progress note):           Plan:  Discharge from therapy.  Patient was seen for OT eval and one follow up session on education on fatigue management and  cognitive fatigue.  Goals not reassessed as patient did not come to follow up therapy appts scheduled.      Discharge:    Reason for Discharge: Patient has failed to schedule further appointments.    Discharge Plan: Patient to continue home program.

## 2023-05-16 NOTE — ADDENDUM NOTE
Encounter addended by: Gabrielle Dickey, OT on: 5/16/2023 3:30 PM   Actions taken: Flowsheet data copied forward, Flowsheet accepted, Clinical Note Signed, Episode resolved

## 2023-05-22 ENCOUNTER — TELEPHONE (OUTPATIENT)
Dept: OBGYN | Facility: CLINIC | Age: 53
End: 2023-05-22
Payer: COMMERCIAL

## 2023-05-22 DIAGNOSIS — N95.0 POSTMENOPAUSAL BLEEDING: Primary | ICD-10-CM

## 2023-05-22 NOTE — TELEPHONE ENCOUNTER
Reason for call:  Order   Order or referral being requested: Ultrasound  Reason for request: scheduled, but no order placed  Date needed: 5/24/2023  Has the patient been seen by the PCP for this problem? unsure    Additional comments: na    Phone number to reach patient:  Cell number on file:    Telephone Information:   Mobile [phone]       Best Time:  any    Can we leave a detailed message on this number?  Not Applicable    Travel screening: Not Applicable

## 2023-05-23 NOTE — PROGRESS NOTES
"  SUBJECTIVE:                                                   Maribel Mcgrath is a 52 year old female who presents to clinic today for the following health issue(s):  Patient presents with:  Ultrasound: F/u to ovarian cyst.     HPI:  Patient is having US to follow-up for cysts noted on her ovary  CA-125 was 7 on 2/28  Went to [facility] on 3/13 and saw family practice and requested ultrasound be done at that time.    US here on 2/15/2023: simple 4 mm cyst of right ovary. Hypoechoic are of left ovary measuring 9mm    Had US done 3/15 at [facility] clinic: 1 mm endometrial stripe, Right ovary 1.2 x 1 x 1.3 cm and Left ovary 1.2 x 0.8 x 0.9 cm. 9mm hypoechoic avascular area noted in left ovary. 4mm simple right ovarian cyst.  No free fluid.     Went through menopause around age 44. Never had any spotting   Has been occurring for about 6 months  In last 2 weeks brown discharge and heavier  Having cramping on the right side. \"pinching feeling\"      No LMP recorded. Patient is postmenopausal..       Today's PHQ-2 Score:       2/27/2023    10:35 AM   PHQ-2 ( 1999 [employer])   Q1: Little interest or pleasure in doing things 3   Q2: Feeling down, depressed or hopeless 3   PHQ-2 Score 6   Q1: Little interest or pleasure in doing things Nearly every day   Q2: Feeling down, depressed or hopeless Nearly every day   PHQ-2 Score 6     Today's PHQ-9 Score:       2/27/2023    10:35 AM   PHQ-9 SCORE   PHQ-9 Total Score MyChart 22 (Severe depression)   PHQ-9 Total Score 22     Today's IVAN-7 Score:       1/31/2023     2:54 PM   IVAN-7 SCORE   Total Score 21       Problem list and histories reviewed & adjusted, as indicated.  Additional history: as documented.    Patient Active Problem List   Diagnosis     ADD (attention deficit disorder) without hyperactivity     Depression, major, single episode, moderate (H)     Hyperopia, bilateral     Mixed hyperlipidemia     Presbyopia     Past Surgical History:   Procedure Laterality Date     " "ADENOIDECTOMY       COSMETIC MAMMOPLASTY AUGMENTATION BILATERAL  2011      Social History     Tobacco Use     Smoking status: Never     Smokeless tobacco: Never   Vaping Use     Vaping status: Not on file   Substance Use Topics     Alcohol use: Yes     Alcohol/week: 0.0 standard drinks of alcohol      Problem (# of Occurrences) Relation (Name,Age of Onset)    Diabetes (1) Maternal Grandmother    Heart Disease (2) Mother, Father    Hyperlipidemia (2) Mother, Father            Current Outpatient Medications   Medication Sig     VYVANSE 30 MG capsule      No current facility-administered medications for this visit.     Allergies   Allergen Reactions     Atorvastatin Muscle Pain (Myalgia)     Severe body cramps       ROS:  12 point review of systems negative other than symptoms noted below or in the HPI.        OBJECTIVE:     /72   Ht 1.511 m (4' 11.5\")   Wt 39 kg (86 lb)   BMI 17.08 kg/m    Body mass index is 17.08 kg/m .    Exam:  Constitutional:  Appearance: Well nourished, well developed alert, in no acute distress  Psychiatric:  Mentation appears normal and affect normal/bright.     In-Clinic Test Results:  Results for orders placed or performed in visit on 05/24/23 (from the past 24 hour(s))   US Transvaginal Pelvic Non-OB    Narrative    Table formatting from the original result was not included.  US Transvaginal Pelvic Non-OB  Order #: [id number] Accession #: [id number]  Study Notes       Spurling, Brittnee on 5/24/2023 10:27 AM      Gynecological Ultrasound Report  Pelvic U/S - Transvaginal  [facility]  Referring Provider: Beth Sweet MD   Sonographer:  Brittnee Spurling, RDMS  Indication: Bleeding/Menses- Postmenopausal bleeding  LMP: No LMP recorded. Patient is postmenopausal.  History:   Gynecological Ultrasonography:   Uterus: retroverted. Contour is smooth/regular.  Size: 4.9 x 3.4 x 2.5 cm  Endometrium: Thickness Total 3.1 mm  Findings: small amount of fluid in " endometrium  Right Ovary: 2.2 x 1.7 x 1.0 cm. Wnl  Left Ovary: 2.9 x 1.3 x 1.2 cm. Complex cyst 1.1 x 0.8 x 1.0 cm  Cul de Sac Free Fluid: No free fluid     Impression:            Endometrium measuring 3 mm in thickness.  There is small amount of fluid   noted within endometrial lining.  In setting of postmenopausal bleeding   consider endometrial sampling.  1 cm Complex cyst again noted in left ovary. No doppler flow noted within   cyst. Consider follow-up in 6 months    Beth Sweet MD       ASSESSMENT/PLAN:                                                        ICD-10-CM    1. Postmenopausal bleeding  N95.0       2. Cyst of right ovary  N83.201           There are no Patient Instructions on file for this visit.    -Reviewed US from today.  Discussed left ovarian cyst is essentially unchanged. Recommend repeat US in 6 months to document stability  -We discussed evaluation of postmenopausal bleeding.  Since she has been having persistence of the bleeding and with findings of fluid in endometrial canal, I recommend endometrial sampling.  We discussed the option of in office biopsy vs. Hysteroscopy.  We reviewed the risks and benefits of the  procedure.  We discussed risks of bleeding, infection, uterine perforation, and damage to adjacent organs.  We reviewed typical recovery time after D&C, hysteroscopy.  Patient verbalizes understanding and desires to proceed with endometrial biopsy.  She will return next week.  Recommend ibuprofen 600-800mg 1 hour prior to procedure.    Patient does have a stress test scheduled later this week due to family history of MI and stroke.    (35 minutes was spent on the date of the encounter doing chart review, review of outside records, review and interpretation of pertinent test results, history and exam, documentation, patient counseling, and further activities as noted above.)      Beth Sweet MD  [facility] [place]

## 2023-05-24 ENCOUNTER — OFFICE VISIT (OUTPATIENT)
Dept: OBGYN | Facility: CLINIC | Age: 53
End: 2023-05-24
Payer: COMMERCIAL

## 2023-05-24 ENCOUNTER — ANCILLARY PROCEDURE (OUTPATIENT)
Dept: ULTRASOUND IMAGING | Facility: CLINIC | Age: 53
End: 2023-05-24
Payer: COMMERCIAL

## 2023-05-24 VITALS
HEIGHT: 60 IN | WEIGHT: 86 LBS | SYSTOLIC BLOOD PRESSURE: 104 MMHG | BODY MASS INDEX: 16.88 KG/M2 | DIASTOLIC BLOOD PRESSURE: 72 MMHG

## 2023-05-24 DIAGNOSIS — N95.0 POSTMENOPAUSAL BLEEDING: ICD-10-CM

## 2023-05-24 DIAGNOSIS — N83.201 CYST OF RIGHT OVARY: ICD-10-CM

## 2023-05-24 DIAGNOSIS — N95.0 POSTMENOPAUSAL BLEEDING: Primary | ICD-10-CM

## 2023-05-24 PROCEDURE — 99214 OFFICE O/P EST MOD 30 MIN: CPT | Performed by: OBSTETRICS & GYNECOLOGY

## 2023-05-24 PROCEDURE — 76830 TRANSVAGINAL US NON-OB: CPT | Performed by: OBSTETRICS & GYNECOLOGY

## 2023-05-24 RX ORDER — LISDEXAMFETAMINE DIMESYLATE 30 MG/1
CAPSULE ORAL
COMMUNITY
Start: 2023-05-02

## 2023-06-01 ENCOUNTER — OFFICE VISIT (OUTPATIENT)
Dept: OBGYN | Facility: CLINIC | Age: 53
End: 2023-06-01
Payer: COMMERCIAL

## 2023-06-01 VITALS — WEIGHT: 86 LBS | HEIGHT: 59 IN | BODY MASS INDEX: 17.34 KG/M2

## 2023-06-01 DIAGNOSIS — N85.9 FLUID IN ENDOMETRIAL CAVITY: Primary | ICD-10-CM

## 2023-06-01 PROCEDURE — 58100 BIOPSY OF UTERUS LINING: CPT | Performed by: OBSTETRICS & GYNECOLOGY

## 2023-06-01 PROCEDURE — 88305 TISSUE EXAM BY PATHOLOGIST: CPT | Performed by: PATHOLOGY

## 2023-06-01 NOTE — PROGRESS NOTES
INDICATIONS:                                                      Having endometrial biopsy for fluid within endometrial cavity    Is a pregnancy test required: No.  Was a consent obtained?  Yes      PROCEDURE;                                                      A speculum was placed in the vagina and cervix prepped with betadine. A tenaculum was attached to the cervix. A small plastic 5 mm Pipelle syringe curette was inserted into the cervical canal. The uterus was sounded to 7 cm's. A vigorous four quadrant biopsy was performed, removing amount scant of tissue. The speculum was removed. This tissue was placed in [place] and sent to pathology.    The patient tolerated the procedure  well and she reported there was  cramping.      POST PROCEDURE;                                                      There  was no cramping at the time of discharge. She  tolerated the procedure well. There were no complications. Patient was discharged in stable condition.    Patient advised to call the clinic if severe pelvic pain, fever or heavy bleeding.    Beth Sweet MD

## 2023-06-05 LAB
PATH REPORT.COMMENTS IMP SPEC: NORMAL
PATH REPORT.COMMENTS IMP SPEC: NORMAL
PATH REPORT.FINAL DX SPEC: NORMAL
PATH REPORT.GROSS SPEC: NORMAL
PATH REPORT.MICROSCOPIC SPEC OTHER STN: NORMAL
PATH REPORT.RELEVANT HX SPEC: NORMAL
PHOTO IMAGE: NORMAL

## 2023-11-05 ENCOUNTER — HEALTH MAINTENANCE LETTER (OUTPATIENT)
Age: 53
End: 2023-11-05

## 2023-12-28 ENCOUNTER — ANCILLARY PROCEDURE (OUTPATIENT)
Dept: MAMMOGRAPHY | Facility: CLINIC | Age: 53
End: 2023-12-28
Payer: COMMERCIAL

## 2023-12-28 DIAGNOSIS — Z12.31 VISIT FOR SCREENING MAMMOGRAM: ICD-10-CM

## 2023-12-28 PROCEDURE — 77067 SCR MAMMO BI INCL CAD: CPT | Mod: TC | Performed by: RADIOLOGY

## 2023-12-28 PROCEDURE — 77063 BREAST TOMOSYNTHESIS BI: CPT | Mod: TC | Performed by: RADIOLOGY

## 2024-12-22 ENCOUNTER — HEALTH MAINTENANCE LETTER (OUTPATIENT)
Age: 54
End: 2024-12-22

## 2025-03-05 ENCOUNTER — HOSPITAL ENCOUNTER (OUTPATIENT)
Dept: MAMMOGRAPHY | Facility: CLINIC | Age: 55
Discharge: HOME | End: 2025-03-05
Payer: COMMERCIAL

## 2025-03-05 DIAGNOSIS — Z12.31 VISIT FOR SCREENING MAMMOGRAM: ICD-10-CM

## 2025-03-05 PROCEDURE — 77063 BREAST TOMOSYNTHESIS BI: CPT
